# Patient Record
Sex: FEMALE | Race: WHITE | NOT HISPANIC OR LATINO | Employment: OTHER | URBAN - METROPOLITAN AREA
[De-identification: names, ages, dates, MRNs, and addresses within clinical notes are randomized per-mention and may not be internally consistent; named-entity substitution may affect disease eponyms.]

---

## 2017-02-09 ENCOUNTER — AMBULATORY - HEALTHEAST (OUTPATIENT)
Dept: CARDIOLOGY | Facility: CLINIC | Age: 82
End: 2017-02-09

## 2017-02-14 ENCOUNTER — AMBULATORY - HEALTHEAST (OUTPATIENT)
Dept: CARDIOLOGY | Facility: CLINIC | Age: 82
End: 2017-02-14

## 2017-02-14 ENCOUNTER — OFFICE VISIT - HEALTHEAST (OUTPATIENT)
Dept: CARDIOLOGY | Facility: CLINIC | Age: 82
End: 2017-02-14

## 2017-02-14 DIAGNOSIS — Z95.0 CARDIAC PACEMAKER IN SITU: ICD-10-CM

## 2017-02-14 DIAGNOSIS — I10 ESSENTIAL HYPERTENSION WITH GOAL BLOOD PRESSURE LESS THAN 140/90: ICD-10-CM

## 2017-02-14 DIAGNOSIS — I44.1: ICD-10-CM

## 2017-02-14 ASSESSMENT — MIFFLIN-ST. JEOR: SCORE: 947.52

## 2017-05-11 ENCOUNTER — AMBULATORY - HEALTHEAST (OUTPATIENT)
Dept: CARDIOLOGY | Facility: CLINIC | Age: 82
End: 2017-05-11

## 2017-05-11 DIAGNOSIS — Z95.0 CARDIAC PACEMAKER IN SITU: ICD-10-CM

## 2017-05-11 LAB — HCC DEVICE COMMENTS: NORMAL

## 2017-08-17 ENCOUNTER — AMBULATORY - HEALTHEAST (OUTPATIENT)
Dept: CARDIOLOGY | Facility: CLINIC | Age: 82
End: 2017-08-17

## 2017-08-17 DIAGNOSIS — Z95.0 CARDIAC PACEMAKER IN SITU: ICD-10-CM

## 2017-08-17 LAB — HCC DEVICE COMMENTS: NORMAL

## 2017-11-30 ENCOUNTER — AMBULATORY - HEALTHEAST (OUTPATIENT)
Dept: CARDIOLOGY | Facility: CLINIC | Age: 82
End: 2017-11-30

## 2017-11-30 DIAGNOSIS — Z95.0 CARDIAC PACEMAKER IN SITU: ICD-10-CM

## 2017-11-30 LAB — HCC DEVICE COMMENTS: NORMAL

## 2018-01-04 ENCOUNTER — RECORDS - HEALTHEAST (OUTPATIENT)
Dept: LAB | Facility: CLINIC | Age: 83
End: 2018-01-04

## 2018-01-04 LAB
ALBUMIN SERPL-MCNC: 3.3 G/DL (ref 3.5–5)
ALP SERPL-CCNC: 71 U/L (ref 45–120)
ALT SERPL W P-5'-P-CCNC: 20 U/L (ref 0–45)
ANION GAP SERPL CALCULATED.3IONS-SCNC: 11 MMOL/L (ref 5–18)
AST SERPL W P-5'-P-CCNC: 27 U/L (ref 0–40)
BILIRUB SERPL-MCNC: 0.3 MG/DL (ref 0–1)
BUN SERPL-MCNC: 25 MG/DL (ref 8–28)
CALCIUM SERPL-MCNC: 9.5 MG/DL (ref 8.5–10.5)
CHLORIDE BLD-SCNC: 104 MMOL/L (ref 98–107)
CHOLEST SERPL-MCNC: 183 MG/DL
CO2 SERPL-SCNC: 28 MMOL/L (ref 22–31)
CREAT SERPL-MCNC: 0.81 MG/DL (ref 0.6–1.1)
FASTING STATUS PATIENT QL REPORTED: NO
GFR SERPL CREATININE-BSD FRML MDRD: >60 ML/MIN/1.73M2
GLUCOSE BLD-MCNC: 167 MG/DL (ref 70–125)
HDLC SERPL-MCNC: 64 MG/DL
LDLC SERPL CALC-MCNC: 99 MG/DL
POTASSIUM BLD-SCNC: 3.7 MMOL/L (ref 3.5–5)
PROT SERPL-MCNC: 6.1 G/DL (ref 6–8)
SODIUM SERPL-SCNC: 143 MMOL/L (ref 136–145)
TRIGL SERPL-MCNC: 100 MG/DL

## 2018-03-06 ENCOUNTER — RECORDS - HEALTHEAST (OUTPATIENT)
Dept: ADMINISTRATIVE | Facility: OTHER | Age: 83
End: 2018-03-06

## 2018-03-06 ENCOUNTER — AMBULATORY - HEALTHEAST (OUTPATIENT)
Dept: CARDIOLOGY | Facility: CLINIC | Age: 83
End: 2018-03-06

## 2018-03-08 ENCOUNTER — AMBULATORY - HEALTHEAST (OUTPATIENT)
Dept: CARDIOLOGY | Facility: CLINIC | Age: 83
End: 2018-03-08

## 2018-03-08 ENCOUNTER — OFFICE VISIT - HEALTHEAST (OUTPATIENT)
Dept: CARDIOLOGY | Facility: CLINIC | Age: 83
End: 2018-03-08

## 2018-03-08 DIAGNOSIS — Z95.0 CARDIAC PACEMAKER IN SITU: ICD-10-CM

## 2018-03-08 DIAGNOSIS — I10 ESSENTIAL HYPERTENSION: ICD-10-CM

## 2018-03-08 DIAGNOSIS — Z95.0 S/P PLACEMENT OF CARDIAC PACEMAKER: ICD-10-CM

## 2018-03-08 LAB — HCC DEVICE COMMENTS: NORMAL

## 2018-03-08 ASSESSMENT — MIFFLIN-ST. JEOR
SCORE: 959.53
SCORE: 957.72

## 2018-06-07 ENCOUNTER — AMBULATORY - HEALTHEAST (OUTPATIENT)
Dept: CARDIOLOGY | Facility: CLINIC | Age: 83
End: 2018-06-07

## 2018-06-07 DIAGNOSIS — Z95.0 S/P PLACEMENT OF CARDIAC PACEMAKER: ICD-10-CM

## 2018-06-08 LAB
HCC DEVICE COMMENTS: NORMAL
HCC DEVICE IMPLANTING PROVIDER: NORMAL
HCC DEVICE MANUFACTURE: NORMAL
HCC DEVICE MODEL: NORMAL
HCC DEVICE SERIAL NUMBER: NORMAL
HCC DEVICE TYPE: NORMAL

## 2018-09-13 ENCOUNTER — AMBULATORY - HEALTHEAST (OUTPATIENT)
Dept: CARDIOLOGY | Facility: CLINIC | Age: 83
End: 2018-09-13

## 2018-09-13 DIAGNOSIS — Z95.0 CARDIAC PACEMAKER IN SITU: ICD-10-CM

## 2018-12-20 ENCOUNTER — AMBULATORY - HEALTHEAST (OUTPATIENT)
Dept: CARDIOLOGY | Facility: CLINIC | Age: 83
End: 2018-12-20

## 2018-12-20 DIAGNOSIS — Z95.0 CARDIAC PACEMAKER IN SITU: ICD-10-CM

## 2019-01-15 ENCOUNTER — RECORDS - HEALTHEAST (OUTPATIENT)
Dept: LAB | Facility: CLINIC | Age: 84
End: 2019-01-15

## 2019-01-15 LAB
ALBUMIN SERPL-MCNC: 3.6 G/DL (ref 3.5–5)
ALP SERPL-CCNC: 48 U/L (ref 45–120)
ALT SERPL W P-5'-P-CCNC: 18 U/L (ref 0–45)
ANION GAP SERPL CALCULATED.3IONS-SCNC: 10 MMOL/L (ref 5–18)
AST SERPL W P-5'-P-CCNC: 26 U/L (ref 0–40)
BILIRUB SERPL-MCNC: 0.8 MG/DL (ref 0–1)
BUN SERPL-MCNC: 20 MG/DL (ref 8–28)
CALCIUM SERPL-MCNC: 9.6 MG/DL (ref 8.5–10.5)
CHLORIDE BLD-SCNC: 100 MMOL/L (ref 98–107)
CHOLEST SERPL-MCNC: 194 MG/DL
CO2 SERPL-SCNC: 29 MMOL/L (ref 22–31)
CREAT SERPL-MCNC: 0.83 MG/DL (ref 0.6–1.1)
FASTING STATUS PATIENT QL REPORTED: YES
GFR SERPL CREATININE-BSD FRML MDRD: >60 ML/MIN/1.73M2
GLUCOSE BLD-MCNC: 90 MG/DL (ref 70–125)
HDLC SERPL-MCNC: 68 MG/DL
LDLC SERPL CALC-MCNC: 112 MG/DL
POTASSIUM BLD-SCNC: 3.9 MMOL/L (ref 3.5–5)
PROT SERPL-MCNC: 6.4 G/DL (ref 6–8)
SODIUM SERPL-SCNC: 139 MMOL/L (ref 136–145)
TRIGL SERPL-MCNC: 69 MG/DL

## 2019-02-12 ENCOUNTER — RECORDS - HEALTHEAST (OUTPATIENT)
Dept: ADMINISTRATIVE | Facility: OTHER | Age: 84
End: 2019-02-12

## 2019-02-12 ENCOUNTER — AMBULATORY - HEALTHEAST (OUTPATIENT)
Dept: CARDIOLOGY | Facility: CLINIC | Age: 84
End: 2019-02-12

## 2019-05-09 ENCOUNTER — AMBULATORY - HEALTHEAST (OUTPATIENT)
Dept: CARDIOLOGY | Facility: CLINIC | Age: 84
End: 2019-05-09

## 2019-05-09 DIAGNOSIS — Z95.0 CARDIAC PACEMAKER IN SITU: ICD-10-CM

## 2019-07-22 ENCOUNTER — AMBULATORY - HEALTHEAST (OUTPATIENT)
Dept: CARDIOLOGY | Facility: CLINIC | Age: 84
End: 2019-07-22

## 2019-07-22 ENCOUNTER — OFFICE VISIT - HEALTHEAST (OUTPATIENT)
Dept: CARDIOLOGY | Facility: CLINIC | Age: 84
End: 2019-07-22

## 2019-07-22 DIAGNOSIS — Z95.0 CARDIAC PACEMAKER IN SITU: ICD-10-CM

## 2019-07-22 DIAGNOSIS — Z95.0 S/P PLACEMENT OF CARDIAC PACEMAKER: ICD-10-CM

## 2019-07-22 DIAGNOSIS — I10 ESSENTIAL HYPERTENSION: ICD-10-CM

## 2019-07-22 RX ORDER — ALENDRONATE SODIUM 70 MG/1
70 TABLET ORAL WEEKLY
Refills: 3 | Status: SHIPPED | COMMUNITY
Start: 2019-04-27 | End: 2024-06-03

## 2019-07-22 ASSESSMENT — MIFFLIN-ST. JEOR: SCORE: 954.32

## 2019-10-17 ENCOUNTER — AMBULATORY - HEALTHEAST (OUTPATIENT)
Dept: CARDIOLOGY | Facility: CLINIC | Age: 84
End: 2019-10-17

## 2019-10-17 DIAGNOSIS — Z95.0 CARDIAC PACEMAKER IN SITU: ICD-10-CM

## 2020-01-16 ENCOUNTER — AMBULATORY - HEALTHEAST (OUTPATIENT)
Dept: CARDIOLOGY | Facility: CLINIC | Age: 85
End: 2020-01-16

## 2020-01-16 DIAGNOSIS — Z95.0 CARDIAC PACEMAKER IN SITU: ICD-10-CM

## 2020-01-16 DIAGNOSIS — I44.1 SECOND DEGREE AV BLOCK: ICD-10-CM

## 2020-01-27 ENCOUNTER — RECORDS - HEALTHEAST (OUTPATIENT)
Dept: LAB | Facility: CLINIC | Age: 85
End: 2020-01-27

## 2020-01-27 LAB
ALBUMIN SERPL-MCNC: 3.4 G/DL (ref 3.5–5)
ALP SERPL-CCNC: 52 U/L (ref 45–120)
ALT SERPL W P-5'-P-CCNC: 19 U/L (ref 0–45)
ANION GAP SERPL CALCULATED.3IONS-SCNC: 11 MMOL/L (ref 5–18)
AST SERPL W P-5'-P-CCNC: 28 U/L (ref 0–40)
BILIRUB SERPL-MCNC: 0.2 MG/DL (ref 0–1)
BUN SERPL-MCNC: 21 MG/DL (ref 8–28)
CALCIUM SERPL-MCNC: 9.3 MG/DL (ref 8.5–10.5)
CHLORIDE BLD-SCNC: 103 MMOL/L (ref 98–107)
CHOLEST SERPL-MCNC: 175 MG/DL
CO2 SERPL-SCNC: 24 MMOL/L (ref 22–31)
CREAT SERPL-MCNC: 0.83 MG/DL (ref 0.6–1.1)
FASTING STATUS PATIENT QL REPORTED: NORMAL
GFR SERPL CREATININE-BSD FRML MDRD: >60 ML/MIN/1.73M2
GLUCOSE BLD-MCNC: 106 MG/DL (ref 70–125)
HDLC SERPL-MCNC: 64 MG/DL
LDLC SERPL CALC-MCNC: 82 MG/DL
POTASSIUM BLD-SCNC: 3.4 MMOL/L (ref 3.5–5)
PROT SERPL-MCNC: 6 G/DL (ref 6–8)
SODIUM SERPL-SCNC: 138 MMOL/L (ref 136–145)
TRIGL SERPL-MCNC: 147 MG/DL

## 2020-01-28 LAB — HCV AB SERPL QL IA: NEGATIVE

## 2020-04-16 ENCOUNTER — AMBULATORY - HEALTHEAST (OUTPATIENT)
Dept: CARDIOLOGY | Facility: CLINIC | Age: 85
End: 2020-04-16

## 2020-04-16 DIAGNOSIS — Z95.0 CARDIAC PACEMAKER IN SITU: ICD-10-CM

## 2020-04-16 DIAGNOSIS — I44.1 SECOND DEGREE AV BLOCK: ICD-10-CM

## 2020-08-13 ENCOUNTER — AMBULATORY - HEALTHEAST (OUTPATIENT)
Dept: CARDIOLOGY | Facility: CLINIC | Age: 85
End: 2020-08-13

## 2020-08-13 DIAGNOSIS — Z95.0 CARDIAC PACEMAKER IN SITU: ICD-10-CM

## 2020-08-13 DIAGNOSIS — I44.1 SECOND DEGREE AV BLOCK: ICD-10-CM

## 2020-09-08 ENCOUNTER — RECORDS - HEALTHEAST (OUTPATIENT)
Dept: ADMINISTRATIVE | Facility: OTHER | Age: 85
End: 2020-09-08

## 2020-09-08 ENCOUNTER — COMMUNICATION - HEALTHEAST (OUTPATIENT)
Dept: CARDIOLOGY | Facility: CLINIC | Age: 85
End: 2020-09-08

## 2020-09-09 ENCOUNTER — OFFICE VISIT - HEALTHEAST (OUTPATIENT)
Dept: CARDIOLOGY | Facility: CLINIC | Age: 85
End: 2020-09-09

## 2020-09-09 DIAGNOSIS — I10 ESSENTIAL HYPERTENSION: ICD-10-CM

## 2020-09-09 DIAGNOSIS — I44.1 SECOND DEGREE AV BLOCK: ICD-10-CM

## 2020-09-09 DIAGNOSIS — Z95.0 S/P PLACEMENT OF CARDIAC PACEMAKER: ICD-10-CM

## 2020-09-09 ASSESSMENT — MIFFLIN-ST. JEOR: SCORE: 978.36

## 2020-11-12 ENCOUNTER — AMBULATORY - HEALTHEAST (OUTPATIENT)
Dept: CARDIOLOGY | Facility: CLINIC | Age: 85
End: 2020-11-12

## 2020-11-12 DIAGNOSIS — Z95.0 S/P PLACEMENT OF CARDIAC PACEMAKER: ICD-10-CM

## 2020-11-12 DIAGNOSIS — I44.1 SECOND DEGREE AV BLOCK: ICD-10-CM

## 2021-02-08 ENCOUNTER — RECORDS - HEALTHEAST (OUTPATIENT)
Dept: LAB | Facility: CLINIC | Age: 86
End: 2021-02-08

## 2021-02-08 LAB
ALBUMIN SERPL-MCNC: 3.7 G/DL (ref 3.5–5)
ALP SERPL-CCNC: 50 U/L (ref 45–120)
ALT SERPL W P-5'-P-CCNC: 17 U/L (ref 0–45)
ANION GAP SERPL CALCULATED.3IONS-SCNC: 9 MMOL/L (ref 5–18)
AST SERPL W P-5'-P-CCNC: 27 U/L (ref 0–40)
BILIRUB SERPL-MCNC: 0.3 MG/DL (ref 0–1)
BUN SERPL-MCNC: 23 MG/DL (ref 8–28)
CALCIUM SERPL-MCNC: 9 MG/DL (ref 8.5–10.5)
CHLORIDE BLD-SCNC: 100 MMOL/L (ref 98–107)
CHOLEST SERPL-MCNC: 180 MG/DL
CO2 SERPL-SCNC: 29 MMOL/L (ref 22–31)
CREAT SERPL-MCNC: 0.7 MG/DL (ref 0.6–1.1)
FASTING STATUS PATIENT QL REPORTED: NO
GFR SERPL CREATININE-BSD FRML MDRD: >60 ML/MIN/1.73M2
GLUCOSE BLD-MCNC: 81 MG/DL (ref 70–125)
HDLC SERPL-MCNC: 67 MG/DL
LDLC SERPL CALC-MCNC: 85 MG/DL
POTASSIUM BLD-SCNC: 3.7 MMOL/L (ref 3.5–5)
PROT SERPL-MCNC: 6.2 G/DL (ref 6–8)
SODIUM SERPL-SCNC: 138 MMOL/L (ref 136–145)
TRIGL SERPL-MCNC: 138 MG/DL

## 2021-02-11 ENCOUNTER — AMBULATORY - HEALTHEAST (OUTPATIENT)
Dept: CARDIOLOGY | Facility: CLINIC | Age: 86
End: 2021-02-11

## 2021-02-11 DIAGNOSIS — Z95.0 S/P PLACEMENT OF CARDIAC PACEMAKER: ICD-10-CM

## 2021-02-11 DIAGNOSIS — I44.1 SECOND DEGREE AV BLOCK: ICD-10-CM

## 2021-05-13 ENCOUNTER — AMBULATORY - HEALTHEAST (OUTPATIENT)
Dept: CARDIOLOGY | Facility: CLINIC | Age: 86
End: 2021-05-13

## 2021-05-13 DIAGNOSIS — I44.1 SECOND DEGREE AV BLOCK: ICD-10-CM

## 2021-05-13 DIAGNOSIS — Z95.0 S/P PLACEMENT OF CARDIAC PACEMAKER: ICD-10-CM

## 2021-05-25 ENCOUNTER — RECORDS - HEALTHEAST (OUTPATIENT)
Dept: ADMINISTRATIVE | Facility: CLINIC | Age: 86
End: 2021-05-25

## 2021-05-27 ENCOUNTER — RECORDS - HEALTHEAST (OUTPATIENT)
Dept: ADMINISTRATIVE | Facility: CLINIC | Age: 86
End: 2021-05-27

## 2021-05-28 ENCOUNTER — RECORDS - HEALTHEAST (OUTPATIENT)
Dept: ADMINISTRATIVE | Facility: CLINIC | Age: 86
End: 2021-05-28

## 2021-05-29 ENCOUNTER — RECORDS - HEALTHEAST (OUTPATIENT)
Dept: ADMINISTRATIVE | Facility: CLINIC | Age: 86
End: 2021-05-29

## 2021-05-30 ENCOUNTER — RECORDS - HEALTHEAST (OUTPATIENT)
Dept: ADMINISTRATIVE | Facility: CLINIC | Age: 86
End: 2021-05-30

## 2021-05-30 VITALS — BODY MASS INDEX: 21.62 KG/M2 | WEIGHT: 122 LBS | HEIGHT: 63 IN

## 2021-05-30 NOTE — PROGRESS NOTES
In clinic device check with Dr. Hollingsworth.  Please see link for full device report.  Patient was informed of results and next follow up during today's visit.

## 2021-06-01 VITALS — WEIGHT: 119.4 LBS | HEIGHT: 65 IN | BODY MASS INDEX: 19.89 KG/M2

## 2021-06-01 VITALS — BODY MASS INDEX: 19.83 KG/M2 | WEIGHT: 119 LBS | HEIGHT: 65 IN

## 2021-06-02 ENCOUNTER — RECORDS - HEALTHEAST (OUTPATIENT)
Dept: ADMINISTRATIVE | Facility: CLINIC | Age: 86
End: 2021-06-02

## 2021-06-03 ENCOUNTER — RECORDS - HEALTHEAST (OUTPATIENT)
Dept: ADMINISTRATIVE | Facility: CLINIC | Age: 86
End: 2021-06-03

## 2021-06-03 VITALS — BODY MASS INDEX: 20.49 KG/M2 | HEIGHT: 64 IN | WEIGHT: 120 LBS

## 2021-06-04 VITALS
BODY MASS INDEX: 21.39 KG/M2 | HEART RATE: 72 BPM | DIASTOLIC BLOOD PRESSURE: 60 MMHG | SYSTOLIC BLOOD PRESSURE: 128 MMHG | HEIGHT: 64 IN | WEIGHT: 125.3 LBS | RESPIRATION RATE: 8 BRPM

## 2021-06-11 NOTE — TELEPHONE ENCOUNTER
9/9/2020 11:30 AM (20 min)   [place]     Arrive by: 11:15 AM    OFFICE VISIT    CLEO GRAY [[facility]]    Mabel Hollingsworth MD      Wellness Screening Tool  Symptom Screening:  Do you have one of the following NEW symptoms:    Fever (subjective or >100.0)?  No    A new cough?  No    Shortness of breath?  No     Chills? No     New loss of taste or smell? No     Generalized body aches? No     New persistent headache? No     New sore throat? No     Nausea, vomiting, or diarrhea?  No    Within the past 2 weeks, have you been exposed to someone with a known positive illness below:    COVID-19 (known or suspected)?  No    Chicken pox?  No    Mealses?  No    Pertussis?  No    Patient notified of visitor policy- They may have one person accompany them to their appointment, but they will need to wear a mask and will be screened upon arrival for symptoms: Yes  Pt informed to wear a mask: Yes  Pt notified if they develop any symptoms listed above, prior to their appointment, they are to call the clinic directly at [phone] for further instructions.  Yes  Patient's appointment status: Patient will be seen in clinic as scheduled on 9/9/20.  antony

## 2021-06-12 NOTE — PROGRESS NOTES
TTM device check.  Please see link for full device report.  Patient was informed of results and next follow up via mail. Patient was informed of results and next follow up via phone call.

## 2021-06-15 ENCOUNTER — RECORDS - HEALTHEAST (OUTPATIENT)
Dept: ADMINISTRATIVE | Facility: OTHER | Age: 86
End: 2021-06-15

## 2021-06-15 ENCOUNTER — RECORDS - HEALTHEAST (OUTPATIENT)
Dept: CARDIOLOGY | Facility: CLINIC | Age: 86
End: 2021-06-15

## 2021-06-16 ENCOUNTER — AMBULATORY - HEALTHEAST (OUTPATIENT)
Dept: CARDIOLOGY | Facility: CLINIC | Age: 86
End: 2021-06-16

## 2021-06-16 ENCOUNTER — OFFICE VISIT - HEALTHEAST (OUTPATIENT)
Dept: CARDIOLOGY | Facility: CLINIC | Age: 86
End: 2021-06-16

## 2021-06-16 DIAGNOSIS — I44.1 SECOND DEGREE AV BLOCK: ICD-10-CM

## 2021-06-16 DIAGNOSIS — Z95.0 S/P PLACEMENT OF CARDIAC PACEMAKER: ICD-10-CM

## 2021-06-16 DIAGNOSIS — Z95.0 CARDIAC PACEMAKER IN SITU: ICD-10-CM

## 2021-06-16 DIAGNOSIS — I10 ESSENTIAL HYPERTENSION: ICD-10-CM

## 2021-06-16 ASSESSMENT — MIFFLIN-ST. JEOR: SCORE: 940.71

## 2021-06-16 NOTE — PROGRESS NOTES
In clinic device check with Dr. Holilngsworth.  Please see link for full device report.  Patient was informed of results and next follow up during today's visit.

## 2021-06-25 NOTE — PROGRESS NOTES
Progress Notes by Mabel Hollingsworth MD at 2/14/2017 12:50 PM     Author: Mabel Hollingsworth MD Service: -- Author Type: Physician    Filed: 2/14/2017  1:05 PM Encounter Date: 2/14/2017 Status: Signed    : Mabel Hollingsworth MD (Physician)           Click to link to [facility]     [facility] HEART CARE NOTE       Assessment/Plan:   An 85-year-old woman presents to clinic today for routine cardiology followup.     1. Second degree AV block, status post dual chamber pacemaker placement. The patient's pacemaker interrogation today indicated normal pacemaker function. She has no cardiac arrhythmia.  Clinically, she has been doing quite well, no symptoms.    2. Hypertension. Her blood pressure has been controlled well. Continue amlodipine, lisinopril, and hydrochlorothiazide.      Thank you for the opportunity to be involved in the care of Siobhan Dawson. If you have any questions, please feel free to contact me.  I will see the patient again in 12 months.     History of Present Illness:   It is my pleasure to see Ms. Siobhan Dawson today at the [facility] in [facility] for routine cardiology followup. Siobhan is an 85-year-old pleasant woman with a medical history of essential hypertension, hepatitis C carrier, second-degree AV block, status post dual-chamber pacemaker placement.     The patient states that she has been doing quite well over the last year. She denies any chest pain, shortness of breath, palpitations, dizziness, PND, or leg swelling. She is very active without any symptoms. Her blood pressure is controlled well.     Her pacemaker was interrogated today, which was reported normal function of pacemaker without any cardiac arrhythmia.    Past Medical History:     Patient Active Problem List   Diagnosis   ? Hypertension   ? Second Degree A-V Block   ? Cardiac Devices Pacemaker Present   ? 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 No pertinent  "surgical history.    Family History:   History reviewed. No pertinent family history.    Social History:    reports that she has never smoked. She has never used smokeless tobacco. She reports that she does not use illicit drugs.    Review of Systems:   General: WNL  Eyes: WNL  Ears/Nose/Throat: WNL  Lungs: WNL  Heart: WNL  Stomach: WNL  Bladder: WNL  Muscle/Joints: WNL  Skin: WNL  Nervous System: WNL  Mental Health: WNL     Blood: WNL    Meds:     Current Outpatient Prescriptions:   ?  amLODIPine (NORVASC) 2.5 MG tablet, Take 1 tablet daily, Disp: , Rfl:   ?  aspirin 81 mg chewable tablet, Chew 81 mg every other day. , Disp: , Rfl:   ?  calcium carbonate-vit D3-min 600 mg calcium- 200 unit Tab, Take 1 tablet by mouth every other day. , Disp: , Rfl:   ?  lisinopril-hydrochlorothiazide (PRINZIDE,ZESTORETIC) 20-25 mg per tablet, Take 0.5 tablets by mouth daily. , Disp: , Rfl:   ?  multivitamin therapeutic (THERAGRAN) tablet, Take 1 tablet by mouth daily., Disp: , Rfl:   ?  potassium chloride 20 mEq TbER, Take 1 tablet by mouth daily., Disp: , Rfl:     Allergies:   Review of patient's allergies indicates no known allergies.      Objective:      Physical Exam  122 lb (55.3 kg)  5' 3\" (1.6 m)  Body mass index is 21.61 kg/(m^2).  Visit Vitals   ? /70 (Patient Site: Left Arm, Patient Position: Sitting, Cuff Size: Adult Regular)   ? Pulse 63   ? Ht 5' 3\" (1.6 m)   ? Wt 122 lb (55.3 kg)   ? BMI 21.61 kg/m2       General Appearance:   Awake, Alert, No acute distress.   HEENT:  Pupil equal and reactive to light. No scleral icterus; the mucous membranes were moist.   Neck: No cervical bruits. No JVD. No thyromegaly.     Chest: The spine was straight. The chest was symmetric.   Lungs:   Respirations unlabored; Lungs are clear to auscultation. No crackles. No wheezing.   Cardiovascular:   Regular rhythm and rate, normal first and second heart sounds with no murmurs. No rubs or gallops.    Abdomen:  Soft. No tenderness. " Non-distended. Bowels sounds are present   Extremities: Equal tibial pulses. No leg edema.   Skin: No rashes or ulcers. Warm, Dry.   Musculoskeletal: No tenderness. No deformity.   Neurologic: Mood and affect are appropriate. No focal deficits.         Lab Review   Lab Results   Component Value Date     12/21/2016    K 4.1 12/21/2016     (H) 12/21/2016    CO2 27 12/21/2016    BUN 16 12/21/2016    CREATININE 0.75 12/21/2016    CALCIUM 9.0 12/21/2016     Lab Results   Component Value Date    WBC 6.4 09/12/2011    HGB 13.9 09/12/2011    HCT 40.2 09/12/2011    MCV 89 09/12/2011     09/12/2011     Lab Results   Component Value Date    CHOL 192 12/21/2016    CHOL 192 12/14/2015    CHOL 186 12/11/2014     Lab Results   Component Value Date    HDL 67 12/21/2016    HDL 63 12/14/2015    HDL 71 12/11/2014     Lab Results   Component Value Date    LDLCALC 108 12/21/2016    LDLCALC 111 12/14/2015    LDLCALC 102 12/11/2014     Lab Results   Component Value Date    TRIG 85 12/21/2016    TRIG 90 12/14/2015    TRIG 67 12/11/2014

## 2021-06-26 NOTE — PROGRESS NOTES
Progress Notes by Mabel Hollingsworth MD at 3/8/2018  2:30 PM     Author: Mabel Hollingsworth MD Service: -- Author Type: Physician    Filed: 3/8/2018  2:51 PM Encounter Date: 3/8/2018 Status: Signed    : Mabel Hollingsworth MD (Physician)           Click to link to [facility]     [facility] HEART CARE NOTE       Assessment/Plan:   An 86-year-old woman presents to clinic today for routine cardiology followup.     1. Second degree AV block, status post dual chamber pacemaker placement. The patient's pacemaker interrogation today indicated normal pacemaker function. She has no cardiac arrhythmia.  Clinically, she has been doing quite well, no symptoms.    2. Essential hypertension. Her blood pressure has been controlled well. Continue amlodipine, lisinopril, and hydrochlorothiazide.    Thank you for the opportunity to be involved in the care of Siobhan Dawson. If you have any questions, please feel free to contact me.  I will see the patient again in 12 months.     History of Present Illness:   It is my pleasure to see Ms. Siobhan Dawson today at the [facility] in [facility] for routine cardiology followup. Siobhan is an 86-year-old pleasant woman with a medical history of essential hypertension, hepatitis C carrier, second-degree AV block, status post dual-chamber pacemaker placement.     The patient states that she has been doing quite well over the last year. She got a cold at this time, but improving. She denies chest pain, shortness of breath, palpitations, dizziness, PND, or leg swelling. She is very active without any symptoms. Her blood pressure is controlled well.     Her pacemaker was interrogated today, which was reported normal function of pacemaker without any cardiac arrhythmia.    Past Medical History:     Patient Active Problem List   Diagnosis   ? Hypertension   ? Second Degree A-V Block   ? Cadiac pacemaker in situ, dual chamber   ? 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 No pertinent surgical history.    Family History:   History reviewed. No pertinent family history.     Social History:    reports that she has never smoked. She has never used smokeless tobacco. She reports that she does not use illicit drugs.    Review of Systems:   General: WNL  Eyes: WNL  Ears/Nose/Throat: WNL  Lungs: WNL  Heart: WNL  Stomach: WNL  Bladder: WNL  Muscle/Joints: WNL  Skin: WNL  Nervous System: WNL  Mental Health: WNL     Blood: WNL    Meds:     Current Outpatient Prescriptions:   ?  amLODIPine (NORVASC) 2.5 MG tablet, Take 1 tablet daily, Disp: , Rfl:   ?  aspirin 81 mg chewable tablet, Chew 81 mg every other day. , Disp: , Rfl:   ?  calcium carbonate-vit D3-min 600 mg calcium- 200 unit Tab, Take 1 tablet by mouth every other day. , Disp: , Rfl:   ?  lisinopril-hydrochlorothiazide (PRINZIDE,ZESTORETIC) 20-25 mg per tablet, Take 0.5 tablets by mouth daily. , Disp: , Rfl:   ?  multivitamin therapeutic (THERAGRAN) tablet, Take 1 tablet by mouth daily., Disp: , Rfl:   ?  potassium chloride 20 mEq TbER, Take 1 tablet by mouth daily., Disp: , Rfl:     Allergies:   Review of patient's allergies indicates no known allergies.      Objective:      Physical Exam  119 lb (54 kg)  5' 4.5\" (1.638 m)  Body mass index is 20.11 kg/(m^2).  /74 (Patient Site: Left Arm, Patient Position: Sitting, Cuff Size: Adult Regular)  Pulse 60  Resp 12  Ht 5' 4.5\" (1.638 m)  Wt 119 lb (54 kg)  BMI 20.11 kg/m2    General Appearance:   Awake, Alert, No acute distress.   HEENT:  Pupil equal and reactive to light. No scleral icterus; the mucous membranes were moist.   Neck: No cervical bruits. No JVD. No thyromegaly.     Chest: The spine was straight. The chest was symmetric.   Lungs:   Respirations unlabored; Lungs are clear to auscultation. No crackles. No wheezing.   Cardiovascular:   Regular rhythm and rate, normal first and second heart sounds with no murmurs. No rubs or gallops.  "   Abdomen:  Soft. No tenderness. Non-distended. Bowels sounds are present   Extremities: Equal tibial pulses. No leg edema.   Skin: No rashes or ulcers. Warm, Dry.   Musculoskeletal: No tenderness. No deformity.   Neurologic: Mood and affect are appropriate. No focal deficits.         Pacemaker interrogation today: Personally reviewed  Normal device function, no cardiac arrhytmia    Lab Review   Lab Results   Component Value Date     01/04/2018    K 3.7 01/04/2018     01/04/2018    CO2 28 01/04/2018    BUN 25 01/04/2018    CREATININE 0.81 01/04/2018    CALCIUM 9.5 01/04/2018     Lab Results   Component Value Date    WBC 6.4 09/12/2011    HGB 13.9 09/12/2011    HCT 40.2 09/12/2011    MCV 89 09/12/2011     09/12/2011     Lab Results   Component Value Date    CHOL 183 01/04/2018    CHOL 192 12/21/2016    CHOL 192 12/14/2015     Lab Results   Component Value Date    HDL 64 01/04/2018    HDL 67 12/21/2016    HDL 63 12/14/2015     Lab Results   Component Value Date    LDLCALC 99 01/04/2018    LDLCALC 108 12/21/2016    LDLCALC 111 12/14/2015     Lab Results   Component Value Date    TRIG 100 01/04/2018    TRIG 85 12/21/2016    TRIG 90 12/14/2015

## 2021-06-27 NOTE — PROGRESS NOTES
Progress Notes by Mabel Hollingsworth MD at 7/22/2019 12:50 PM     Author: Mabel Hollingsworth MD Service: -- Author Type: Physician    Filed: 7/22/2019 12:54 PM Encounter Date: 7/22/2019 Status: Signed    : Mabel Hollingsworth MD (Physician)           Click to link to [facility] HEART [facility] NOTE       Assessment/Plan:   An 87-year-old woman presents to clinic today for routine cardiology followup.     1. Second degree AV block, status post dual chamber pacemaker placement. The patient's pacemaker interrogation today indicated normal pacemaker function. She has no cardiac arrhythmia.  Clinically, she has been doing quite well, no symptoms.    2. Essential hypertension. Her blood pressure has been controlled well. Continue amlodipine, lisinopril, and hydrochlorothiazide.    Thank you for the opportunity to be involved in the care of Siobhan Dawson. If you have any questions, please feel free to contact me.  I will see the patient again in 12 months and as needed.     History of Present Illness:   It is my pleasure to see Ms. Siobhan Dawson today at the [facility] for routine cardiology followup. Siobhan is an 87-year-old pleasant woman with a medical history of essential hypertension, hepatitis C carrier, second-degree AV block, status post dual-chamber pacemaker placement.     The patient states that she has been doing quite well over the last year. She denies chest pain, shortness of breath, palpitations, dizziness, PND, or leg swelling. She is very active without any symptoms. Her blood pressure is controlled well.     Her pacemaker was interrogated today, which was reported normal function of pacemaker without any cardiac arrhythmia.    Past Medical History:     Patient Active Problem List   Diagnosis   ? Hypertension   ? S/P placement of cardiac pacemaker   ? Cadiac pacemaker in situ, dual chamber   ? 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 No  "pertinent surgical history.    Family History:   History reviewed. No pertinent family history.     Social History:    reports that she has never smoked. She has never used smokeless tobacco. She reports that she does not use drugs.    Review of Systems:   General: WNL  Eyes: WNL  Ears/Nose/Throat: WNL  Lungs: WNL  Heart: WNL  Stomach: WNL  Bladder: WNL  Muscle/Joints: WNL  Skin: WNL  Nervous System: WNL  Mental Health: WNL     Blood: WNL    Meds:     Current Outpatient Medications:   ?  alendronate (FOSAMAX) 70 MG tablet, Take 70 mg by mouth once a week., Disp: , Rfl: 3  ?  amLODIPine (NORVASC) 2.5 MG tablet, Take 1 tablet daily, Disp: , Rfl:   ?  aspirin 81 mg chewable tablet, Chew 81 mg every other day. , Disp: , Rfl:   ?  lisinopril-hydrochlorothiazide (PRINZIDE,ZESTORETIC) 20-25 mg per tablet, Take 0.5 tablets by mouth daily. , Disp: , Rfl:   ?  multivitamin therapeutic (THERAGRAN) tablet, Take 1 tablet by mouth daily., Disp: , Rfl:   ?  potassium chloride 20 mEq TbER, Take 1 tablet by mouth daily., Disp: , Rfl:   ?  calcium carbonate-vit D3-min 600 mg calcium- 200 unit Tab, Take 1 tablet by mouth every other day. , Disp: , Rfl:     Allergies:   Patient has no known allergies.      Objective:      Physical Exam  120 lb (54.4 kg)  5' 4\" (1.626 m)  Body mass index is 20.6 kg/m .  /64 (Patient Site: Right Arm, Patient Position: Sitting, Cuff Size: Adult Regular)   Pulse 72   Resp 16   Ht 5' 4\" (1.626 m)   Wt 120 lb (54.4 kg)   BMI 20.60 kg/m      General Appearance:   Awake, Alert, No acute distress.   HEENT:  Pupil equal and reactive to light. No scleral icterus; the mucous membranes were moist.   Neck: No cervical bruits. No JVD. No thyromegaly.     Chest: The spine was straight. The chest was symmetric.   Lungs:   Respirations unlabored; Lungs are clear to auscultation. No crackles. No wheezing.   Cardiovascular:   Regular rhythm and rate, normal first and second heart sounds with no murmurs. No rubs " or gallops.    Abdomen:  Soft. No tenderness. Non-distended. Bowels sounds are present   Extremities: Equal tibial pulses. No leg edema.   Skin: No rashes or ulcers. Warm, Dry.   Musculoskeletal: No tenderness. No deformity.   Neurologic: Mood and affect are appropriate. No focal deficits.         Pacemaker interrogation today: Personally reviewed  Normal device function, no cardiac arrhytmia    Lab Review   Lab Results   Component Value Date     01/15/2019    K 3.9 01/15/2019     01/15/2019    CO2 29 01/15/2019    BUN 20 01/15/2019    CREATININE 0.83 01/15/2019    CALCIUM 9.6 01/15/2019     Lab Results   Component Value Date    WBC 6.4 09/12/2011    HGB 13.9 09/12/2011    HCT 40.2 09/12/2011    MCV 89 09/12/2011     09/12/2011     Lab Results   Component Value Date    CHOL 194 01/15/2019    CHOL 183 01/04/2018    CHOL 192 12/21/2016     Lab Results   Component Value Date    HDL 68 01/15/2019    HDL 64 01/04/2018    HDL 67 12/21/2016     Lab Results   Component Value Date    LDLCALC 112 01/15/2019    LDLCALC 99 01/04/2018    LDLCALC 108 12/21/2016     Lab Results   Component Value Date    TRIG 69 01/15/2019    TRIG 100 01/04/2018    TRIG 85 12/21/2016

## 2021-06-29 NOTE — PROGRESS NOTES
Progress Notes by Mabel Hollingsworth MD at 9/9/2020 11:30 AM     Author: Mabel Hollingsworth MD Service: -- Author Type: Physician    Filed: 9/9/2020 11:29 AM Encounter Date: 9/9/2020 Status: Signed    : Mabel Hollingsworth MD (Physician)           Click to link to [facility] HEART [facility] NOTE       Assessment/Plan:   An 88-year-old woman presents to clinic today for routine cardiology followup.     1. Second degree AV block, status post dual chamber pacemaker placement. The patient's pacemaker interrogation today indicated normal pacemaker function. She has no cardiac arrhythmia.  Clinically, she has been doing quite well, no symptoms.    2. Essential hypertension. Her blood pressure has been controlled well. Continue amlodipine, lisinopril-hydrochlorothiazide.    Thank you for the opportunity to be involved in the care of Siobhan Dawson. If you have any questions, please feel free to contact me.  I will see the patient again in 12 months and as needed.     History of Present Illness:   It is my pleasure to see Ms. Siobhan Dawson today at the [facility] for routine cardiology followup. Siobhan is an 88-year-old pleasant woman with a medical history of essential hypertension, hepatitis C carrier, second-degree AV block, status post dual-chamber pacemaker placement.     The patient states that she has been doing quite well over the last year. She denies chest pain, shortness of breath, palpitations, dizziness, PND, or leg swelling. She is very active without any symptoms. Her blood pressure is controlled well.     Her pacemaker check was reported normal function of pacemaker without any cardiac arrhythmia.    Past Medical History:     Patient Active Problem List   Diagnosis   ? Hypertension   ? S/P placement of cardiac pacemaker   ? Cadiac pacemaker in situ, dual chamber   ? 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 Second degree AV block       Past Surgical History:   History reviewed. No pertinent  "surgical history.    Family History:   History reviewed. No pertinent family history.     Social History:    reports that she has never smoked. She has never used smokeless tobacco. She reports that she does not use drugs.    Review of Systems:   General: WNL  Eyes: WNL  Ears/Nose/Throat: WNL  Lungs: WNL  Heart: WNL  Stomach: WNL  Bladder: WNL  Muscle/Joints: WNL  Skin: WNL  Nervous System: WNL  Mental Health: WNL     Blood: WNL    Meds:     Current Outpatient Medications:   ?  alendronate (FOSAMAX) 70 MG tablet, Take 70 mg by mouth once a week., Disp: , Rfl: 3  ?  amLODIPine (NORVASC) 2.5 MG tablet, Take 1 tablet daily, Disp: , Rfl:   ?  aspirin 81 mg chewable tablet, Chew 81 mg every other day. , Disp: , Rfl:   ?  calcium carbonate-vit D3-min 600 mg calcium- 200 unit Tab, Take 1 tablet by mouth every other day. , Disp: , Rfl:   ?  lisinopril-hydrochlorothiazide (PRINZIDE,ZESTORETIC) 20-25 mg per tablet, Take 0.5 tablets by mouth daily. , Disp: , Rfl:   ?  multivitamin therapeutic (THERAGRAN) tablet, Take 1 tablet by mouth daily., Disp: , Rfl:   ?  potassium chloride 20 mEq TbER, Take 1 tablet by mouth daily., Disp: , Rfl:     Allergies:   Patient has no known allergies.      Objective:      Physical Exam  125 lb 4.8 oz (56.8 kg)  5' 4\" (1.626 m)  Body mass index is 21.51 kg/m .  /60 (Patient Site: Left Arm, Patient Position: Sitting, Cuff Size: Adult Regular)   Pulse 72   Resp 8   Ht 5' 4\" (1.626 m)   Wt 125 lb 4.8 oz (56.8 kg)   BMI 21.51 kg/m      General Appearance:   Awake, Alert, No acute distress.   HEENT:  Pupil equal and reactive to light. No scleral icterus; the mucous membranes were moist.   Neck: No cervical bruits. No JVD. No thyromegaly.     Chest: The spine was straight. The chest was symmetric.   Lungs:   Respirations unlabored; Lungs are clear to auscultation. No crackles. No wheezing.   Cardiovascular:   Regular rhythm and rate, normal first and second heart sounds with no murmurs. No " rubs or gallops.    Abdomen:  Soft. No tenderness. Non-distended. Bowels sounds are present   Extremities: Equal tibial pulses. No leg edema.   Skin: No rashes or ulcers. Warm, Dry.   Musculoskeletal: No tenderness. No deformity.   Neurologic: Mood and affect are appropriate. No focal deficits.         Pacemaker interrogation today: Personally reviewed  Normal device function, no cardiac arrhytmia    Lab Review   Lab Results   Component Value Date     01/27/2020    K 3.4 (L) 01/27/2020     01/27/2020    CO2 24 01/27/2020    BUN 21 01/27/2020    CREATININE 0.83 01/27/2020    CALCIUM 9.3 01/27/2020     Lab Results   Component Value Date    WBC 6.4 09/12/2011    HGB 13.9 09/12/2011    HCT 40.2 09/12/2011    MCV 89 09/12/2011     09/12/2011     Lab Results   Component Value Date    CHOL 175 01/27/2020    CHOL 194 01/15/2019    CHOL 183 01/04/2018     Lab Results   Component Value Date    HDL 64 01/27/2020    HDL 68 01/15/2019    HDL 64 01/04/2018     Lab Results   Component Value Date    LDLCALC 82 01/27/2020    LDLCALC 112 01/15/2019    LDLCALC 99 01/04/2018     Lab Results   Component Value Date    TRIG 147 01/27/2020    TRIG 69 01/15/2019    TRIG 100 01/04/2018

## 2021-07-04 NOTE — PROGRESS NOTES
Progress Notes by Mabel Hollingsworth MD at 6/16/2021  8:50 AM     Author: Mabel Hollingsworth MD Service: -- Author Type: Physician    Filed: 6/16/2021  9:09 AM Encounter Date: 6/16/2021 Status: Signed    : Mabel Hollingsworth MD (Physician)           Click to link to [facility] NOTE       Assessment/Plan:   An 89-year-old woman presents to clinic today for routine cardiology followup.     1. Second degree AV block, status post dual chamber pacemaker placement. The patient's pacemaker interrogation today indicated normal pacemaker function. She has no cardiac arrhythmia.      2. Essential hypertension. Her blood pressure has been controlled well. Continue current medications amlodipine, lisinopril-hydrochlorothiazide.    Thank you for the opportunity to be involved in the care of Siobhan Dawson. If you have any questions, please feel free to contact me.  I will see the patient again in 12 months and as needed.     History of Present Illness:   It is my pleasure to see Ms. Siobhan Dawson today at the [facility] for routine cardiology followup. Siobhan is an 89-year-old pleasant woman with a medical history of essential hypertension, hepatitis C carrier, second-degree AV block, status post dual-chamber pacemaker placement.     The patient states that she has been doing quite well over the last year.  She had COVID-19 vaccination.  She denies chest pain, shortness of breath, palpitations, dizziness, PND, or leg swelling.  Her blood pressure has been well controlled.    Her pacemaker interrogation in clinic today which was reported normal device function, no cardiac arrhythmia.      Past Medical History:     Patient Active Problem List   Diagnosis   ? Hypertension   ? S/P placement of cardiac pacemaker   ? Cadiac pacemaker in situ, dual chamber   ? 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 Second degree AV block       Past Surgical History:   History reviewed. No pertinent surgical  "history.    Family History:   History reviewed. No pertinent family history.     Social History:    reports that she has never smoked. She has never used smokeless tobacco. She reports that she does not use drugs.    Review of Systems:   General: WNL  Eyes: WNL  Ears/Nose/Throat: WNL  Lungs: WNL  Heart: WNL  Stomach: WNL  Bladder: Frequent Urination at Night  Muscle/Joints: WNL  Skin: WNL  Nervous System: Daytime Sleepiness  Mental Health: WNL     Blood: WNL    Meds:     Current Outpatient Medications:   ?  alendronate (FOSAMAX) 70 MG tablet, Take 70 mg by mouth once a week., Disp: , Rfl: 3  ?  amLODIPine (NORVASC) 2.5 MG tablet, Take 1 tablet daily, Disp: , Rfl:   ?  aspirin 81 mg chewable tablet, Chew 81 mg every other day. , Disp: , Rfl:   ?  lisinopril-hydrochlorothiazide (PRINZIDE,ZESTORETIC) 20-25 mg per tablet, Take 0.5 tablets by mouth daily. , Disp: , Rfl:   ?  multivitamin therapeutic (THERAGRAN) tablet, Take 1 tablet by mouth daily., Disp: , Rfl:   ?  potassium chloride 20 mEq TbER, Take 1 tablet by mouth daily., Disp: , Rfl:   ?  calcium carbonate-vit D3-min 600 mg calcium- 200 unit Tab, Take 1 tablet by mouth every other day. , Disp: , Rfl:     Allergies:   Patient has no known allergies.      Objective:      Physical Exam  117 lb (53.1 kg)  5' 4\" (1.626 m)  Body mass index is 20.08 kg/m .  /62 (Patient Site: Right Arm, Patient Position: Sitting, Cuff Size: Adult Small)   Pulse 66   Resp 16   Ht 5' 4\" (1.626 m)   Wt 117 lb (53.1 kg) Comment: With shoes.  BMI 20.08 kg/m      General Appearance:   Awake, Alert, No acute distress.   HEENT:  Pupil equal and reactive to light. No scleral icterus; the mucous membranes were moist.   Neck: No cervical bruits. No JVD. No thyromegaly.     Chest: The spine was straight. The chest was symmetric.   Lungs:   Respirations unlabored; Lungs are clear to auscultation. No crackles. No wheezing.   Cardiovascular:   Regular rhythm and rate, normal first and " second heart sounds with no murmurs. No rubs or gallops.    Abdomen:  Soft. No tenderness. Non-distended. Bowels sounds are present   Extremities: Equal tibial pulses. No leg edema.   Skin: No rashes or ulcers. Warm, Dry.   Musculoskeletal: No tenderness. No deformity.   Neurologic: Mood and affect are appropriate. No focal deficits.         Pacemaker interrogation today: Personally reviewed  Normal device function, no cardiac arrhythmia  5 years of battery left.    Lab Review   Lab Results   Component Value Date     02/08/2021    K 3.7 02/08/2021     02/08/2021    CO2 29 02/08/2021    BUN 23 02/08/2021    CREATININE 0.70 02/08/2021    CALCIUM 9.0 02/08/2021     Lab Results   Component Value Date    WBC 6.4 09/12/2011    HGB 13.9 09/12/2011    HCT 40.2 09/12/2011    MCV 89 09/12/2011     09/12/2011     Lab Results   Component Value Date    CHOL 180 02/08/2021    CHOL 175 01/27/2020    CHOL 194 01/15/2019     Lab Results   Component Value Date    HDL 67 02/08/2021    HDL 64 01/27/2020    HDL 68 01/15/2019     Lab Results   Component Value Date    LDLCALC 85 02/08/2021    LDLCALC 82 01/27/2020    LDLCALC 112 01/15/2019     Lab Results   Component Value Date    TRIG 138 02/08/2021    TRIG 147 01/27/2020    TRIG 69 01/15/2019

## 2021-07-06 VITALS
WEIGHT: 117 LBS | RESPIRATION RATE: 16 BRPM | DIASTOLIC BLOOD PRESSURE: 62 MMHG | BODY MASS INDEX: 19.97 KG/M2 | HEART RATE: 66 BPM | HEIGHT: 64 IN | SYSTOLIC BLOOD PRESSURE: 134 MMHG

## 2021-07-13 ENCOUNTER — RECORDS - HEALTHEAST (OUTPATIENT)
Dept: ADMINISTRATIVE | Facility: CLINIC | Age: 86
End: 2021-07-13

## 2021-07-21 ENCOUNTER — RECORDS - HEALTHEAST (OUTPATIENT)
Dept: ADMINISTRATIVE | Facility: CLINIC | Age: 86
End: 2021-07-21

## 2021-09-23 ENCOUNTER — ANCILLARY PROCEDURE (OUTPATIENT)
Dept: CARDIOLOGY | Facility: CLINIC | Age: 86
End: 2021-09-23
Attending: INTERNAL MEDICINE
Payer: MEDICARE

## 2021-09-23 DIAGNOSIS — Z95.0 S/P PLACEMENT OF CARDIAC PACEMAKER: ICD-10-CM

## 2021-09-23 DIAGNOSIS — I44.1 SECOND DEGREE AV BLOCK: Primary | ICD-10-CM

## 2021-09-23 PROCEDURE — 93293 PM PHONE R-STRIP DEVICE EVAL: CPT | Performed by: INTERNAL MEDICINE

## 2021-11-03 LAB
MDC_IDC_LEAD_IMPLANT_DT: NORMAL
MDC_IDC_LEAD_IMPLANT_DT: NORMAL
MDC_IDC_LEAD_LOCATION: NORMAL
MDC_IDC_LEAD_LOCATION: NORMAL
MDC_IDC_LEAD_LOCATION_DETAIL_1: NORMAL
MDC_IDC_LEAD_LOCATION_DETAIL_1: NORMAL
MDC_IDC_LEAD_MFG: NORMAL
MDC_IDC_LEAD_MFG: NORMAL
MDC_IDC_LEAD_MODEL: NORMAL
MDC_IDC_LEAD_MODEL: NORMAL
MDC_IDC_LEAD_POLARITY_TYPE: NORMAL
MDC_IDC_LEAD_POLARITY_TYPE: NORMAL
MDC_IDC_LEAD_SERIAL: NORMAL
MDC_IDC_LEAD_SERIAL: NORMAL
MDC_IDC_PG_IMPLANT_DTM: NORMAL
MDC_IDC_PG_MFG: NORMAL
MDC_IDC_PG_MODEL: NORMAL
MDC_IDC_PG_SERIAL: NORMAL
MDC_IDC_PG_TYPE: NORMAL
MDC_IDC_SESS_CLINIC_NAME: NORMAL
MDC_IDC_SESS_DTM: NORMAL
MDC_IDC_SESS_TYPE: NORMAL

## 2021-12-28 ENCOUNTER — ANCILLARY PROCEDURE (OUTPATIENT)
Dept: CARDIOLOGY | Facility: CLINIC | Age: 86
End: 2021-12-28
Attending: INTERNAL MEDICINE
Payer: MEDICARE

## 2021-12-28 DIAGNOSIS — Z95.0 CARDIAC PACEMAKER IN SITU: ICD-10-CM

## 2021-12-28 PROCEDURE — 93293 PM PHONE R-STRIP DEVICE EVAL: CPT | Performed by: INTERNAL MEDICINE

## 2022-02-07 ENCOUNTER — LAB REQUISITION (OUTPATIENT)
Dept: LAB | Facility: CLINIC | Age: 87
End: 2022-02-07
Payer: MEDICARE

## 2022-02-07 DIAGNOSIS — I10 ESSENTIAL (PRIMARY) HYPERTENSION: ICD-10-CM

## 2022-02-07 LAB
ALBUMIN SERPL-MCNC: 3.5 G/DL (ref 3.5–5)
ALP SERPL-CCNC: 44 U/L (ref 45–120)
ALT SERPL W P-5'-P-CCNC: 24 U/L (ref 0–45)
ANION GAP SERPL CALCULATED.3IONS-SCNC: 10 MMOL/L (ref 5–18)
AST SERPL W P-5'-P-CCNC: 31 U/L (ref 0–40)
BILIRUB SERPL-MCNC: 0.6 MG/DL (ref 0–1)
BUN SERPL-MCNC: 25 MG/DL (ref 8–28)
CALCIUM SERPL-MCNC: 9.2 MG/DL (ref 8.5–10.5)
CHLORIDE BLD-SCNC: 107 MMOL/L (ref 98–107)
CO2 SERPL-SCNC: 26 MMOL/L (ref 22–31)
CREAT SERPL-MCNC: 0.7 MG/DL (ref 0.6–1.1)
GFR SERPL CREATININE-BSD FRML MDRD: 82 ML/MIN/1.73M2
GLUCOSE BLD-MCNC: 91 MG/DL (ref 70–125)
POTASSIUM BLD-SCNC: 3.8 MMOL/L (ref 3.5–5)
PROT SERPL-MCNC: 5.9 G/DL (ref 6–8)
SODIUM SERPL-SCNC: 143 MMOL/L (ref 136–145)

## 2022-02-07 PROCEDURE — 80053 COMPREHEN METABOLIC PANEL: CPT | Mod: ORL | Performed by: FAMILY MEDICINE

## 2022-04-20 ENCOUNTER — ANCILLARY PROCEDURE (OUTPATIENT)
Dept: CARDIOLOGY | Facility: CLINIC | Age: 87
End: 2022-04-20
Attending: INTERNAL MEDICINE
Payer: MEDICARE

## 2022-04-20 DIAGNOSIS — Z95.0 PACEMAKER: ICD-10-CM

## 2022-04-20 DIAGNOSIS — I44.1 SECOND DEGREE AV BLOCK: Primary | ICD-10-CM

## 2022-04-20 LAB
MDC_IDC_LEAD_IMPLANT_DT: NORMAL
MDC_IDC_LEAD_IMPLANT_DT: NORMAL
MDC_IDC_LEAD_LOCATION: NORMAL
MDC_IDC_LEAD_LOCATION: NORMAL
MDC_IDC_LEAD_LOCATION_DETAIL_1: NORMAL
MDC_IDC_LEAD_LOCATION_DETAIL_1: NORMAL
MDC_IDC_LEAD_MFG: NORMAL
MDC_IDC_LEAD_MFG: NORMAL
MDC_IDC_LEAD_MODEL: NORMAL
MDC_IDC_LEAD_MODEL: NORMAL
MDC_IDC_LEAD_POLARITY_TYPE: NORMAL
MDC_IDC_LEAD_POLARITY_TYPE: NORMAL
MDC_IDC_LEAD_SERIAL: NORMAL
MDC_IDC_LEAD_SERIAL: NORMAL
MDC_IDC_MSMT_BATTERY_DTM: NORMAL
MDC_IDC_MSMT_BATTERY_REMAINING_LONGEVITY: 54 MO
MDC_IDC_MSMT_BATTERY_STATUS: NORMAL
MDC_IDC_MSMT_LEADCHNL_RA_IMPEDANCE_VALUE: 580 OHM
MDC_IDC_MSMT_LEADCHNL_RA_PACING_THRESHOLD_AMPLITUDE: 0.7 V
MDC_IDC_MSMT_LEADCHNL_RA_PACING_THRESHOLD_PULSEWIDTH: 0.4 MS
MDC_IDC_MSMT_LEADCHNL_RA_SENSING_INTR_AMPL: 1.5 MV
MDC_IDC_MSMT_LEADCHNL_RV_IMPEDANCE_VALUE: 430 OHM
MDC_IDC_MSMT_LEADCHNL_RV_PACING_THRESHOLD_AMPLITUDE: 0.7 V
MDC_IDC_MSMT_LEADCHNL_RV_PACING_THRESHOLD_PULSEWIDTH: 0.4 MS
MDC_IDC_MSMT_LEADCHNL_RV_SENSING_INTR_AMPL: 8.1 MV
MDC_IDC_PG_IMPLANT_DTM: NORMAL
MDC_IDC_PG_MFG: NORMAL
MDC_IDC_PG_MODEL: NORMAL
MDC_IDC_PG_SERIAL: NORMAL
MDC_IDC_PG_TYPE: NORMAL
MDC_IDC_SESS_CLINIC_NAME: NORMAL
MDC_IDC_SESS_DTM: NORMAL
MDC_IDC_SESS_TYPE: NORMAL
MDC_IDC_SET_BRADY_AT_MODE_SWITCH_MODE: NORMAL
MDC_IDC_SET_BRADY_AT_MODE_SWITCH_RATE: 170 {BEATS}/MIN
MDC_IDC_SET_BRADY_HYSTRATE: NORMAL
MDC_IDC_SET_BRADY_LOWRATE: 60 {BEATS}/MIN
MDC_IDC_SET_BRADY_MAX_TRACKING_RATE: 130 {BEATS}/MIN
MDC_IDC_SET_BRADY_MODE: NORMAL
MDC_IDC_SET_BRADY_PAV_DELAY_HIGH: 110 MS
MDC_IDC_SET_BRADY_PAV_DELAY_LOW: 300 MS
MDC_IDC_SET_BRADY_SAV_DELAY_LOW: 280 MS
MDC_IDC_SET_LEADCHNL_RA_PACING_AMPLITUDE: 1.5 V
MDC_IDC_SET_LEADCHNL_RA_PACING_CAPTURE_MODE: NORMAL
MDC_IDC_SET_LEADCHNL_RA_PACING_POLARITY: NORMAL
MDC_IDC_SET_LEADCHNL_RA_PACING_PULSEWIDTH: 0.4 MS
MDC_IDC_SET_LEADCHNL_RA_SENSING_ADAPTATION_MODE: NORMAL
MDC_IDC_SET_LEADCHNL_RA_SENSING_POLARITY: NORMAL
MDC_IDC_SET_LEADCHNL_RA_SENSING_SENSITIVITY: 0.25 MV
MDC_IDC_SET_LEADCHNL_RV_PACING_AMPLITUDE: NORMAL
MDC_IDC_SET_LEADCHNL_RV_PACING_CAPTURE_MODE: NORMAL
MDC_IDC_SET_LEADCHNL_RV_PACING_POLARITY: NORMAL
MDC_IDC_SET_LEADCHNL_RV_PACING_PULSEWIDTH: 0.4 MS
MDC_IDC_SET_LEADCHNL_RV_SENSING_ADAPTATION_MODE: NORMAL
MDC_IDC_SET_LEADCHNL_RV_SENSING_POLARITY: NORMAL
MDC_IDC_SET_LEADCHNL_RV_SENSING_SENSITIVITY: 2.5 MV
MDC_IDC_STAT_AT_DTM_END: NORMAL
MDC_IDC_STAT_AT_DTM_START: NORMAL
MDC_IDC_STAT_AT_MODE_SW_COUNT: 0
MDC_IDC_STAT_AT_MODE_SW_MAX_DURATION: 0 S
MDC_IDC_STAT_BRADY_DTM_END: NORMAL
MDC_IDC_STAT_BRADY_DTM_START: NORMAL
MDC_IDC_STAT_BRADY_RA_PERCENT_PACED: 20 %
MDC_IDC_STAT_BRADY_RV_PERCENT_PACED: 1 %
MDC_IDC_STAT_EPISODE_RECENT_COUNT: 0
MDC_IDC_STAT_EPISODE_RECENT_COUNT_DTM_END: NORMAL
MDC_IDC_STAT_EPISODE_RECENT_COUNT_DTM_START: NORMAL
MDC_IDC_STAT_EPISODE_TYPE: NORMAL
MDC_IDC_STAT_EPISODE_VENDOR_TYPE: NORMAL

## 2022-04-20 PROCEDURE — 93280 PM DEVICE PROGR EVAL DUAL: CPT | Performed by: INTERNAL MEDICINE

## 2022-05-03 ENCOUNTER — ANCILLARY PROCEDURE (OUTPATIENT)
Dept: BONE DENSITY | Facility: CLINIC | Age: 87
End: 2022-05-03
Attending: FAMILY MEDICINE
Payer: MEDICARE

## 2022-05-03 DIAGNOSIS — M81.0 AGE-RELATED OSTEOPOROSIS WITHOUT CURRENT PATHOLOGICAL FRACTURE: ICD-10-CM

## 2022-05-03 DIAGNOSIS — Z78.0 POST-MENOPAUSAL: ICD-10-CM

## 2022-05-03 PROCEDURE — 77080 DXA BONE DENSITY AXIAL: CPT | Mod: TC | Performed by: INTERNAL MEDICINE

## 2022-05-03 PROCEDURE — 99207 DX HIP/PELVIS/SPINE: CPT | Performed by: INTERNAL MEDICINE

## 2022-05-03 PROCEDURE — 77081 DXA BONE DENSITY APPENDICULR: CPT | Mod: TC | Performed by: INTERNAL MEDICINE

## 2022-06-16 ENCOUNTER — LAB REQUISITION (OUTPATIENT)
Dept: LAB | Facility: CLINIC | Age: 87
End: 2022-06-16
Payer: MEDICARE

## 2022-06-16 DIAGNOSIS — E03.9 HYPOTHYROIDISM, UNSPECIFIED: ICD-10-CM

## 2022-06-16 DIAGNOSIS — D64.9 ANEMIA, UNSPECIFIED: ICD-10-CM

## 2022-06-16 DIAGNOSIS — I10 ESSENTIAL (PRIMARY) HYPERTENSION: ICD-10-CM

## 2022-06-16 DIAGNOSIS — E55.9 VITAMIN D DEFICIENCY, UNSPECIFIED: ICD-10-CM

## 2022-06-30 ENCOUNTER — LAB REQUISITION (OUTPATIENT)
Dept: LAB | Facility: CLINIC | Age: 87
End: 2022-06-30
Payer: MEDICARE

## 2022-06-30 DIAGNOSIS — E03.9 HYPOTHYROIDISM, UNSPECIFIED: ICD-10-CM

## 2022-06-30 DIAGNOSIS — E55.9 VITAMIN D DEFICIENCY, UNSPECIFIED: ICD-10-CM

## 2022-06-30 DIAGNOSIS — I10 ESSENTIAL (PRIMARY) HYPERTENSION: ICD-10-CM

## 2022-06-30 DIAGNOSIS — D64.9 ANEMIA, UNSPECIFIED: ICD-10-CM

## 2022-07-01 LAB
ANION GAP SERPL CALCULATED.3IONS-SCNC: 13 MMOL/L (ref 7–15)
BUN SERPL-MCNC: 31.5 MG/DL (ref 8–23)
CALCIUM SERPL-MCNC: 9.4 MG/DL (ref 8.2–9.6)
CHLORIDE SERPL-SCNC: 102 MMOL/L (ref 98–107)
CREAT SERPL-MCNC: 0.83 MG/DL (ref 0.51–0.95)
DEPRECATED HCO3 PLAS-SCNC: 26 MMOL/L (ref 22–29)
ERYTHROCYTE [DISTWIDTH] IN BLOOD BY AUTOMATED COUNT: 12 % (ref 10–15)
GFR SERPL CREATININE-BSD FRML MDRD: 67 ML/MIN/1.73M2
GLUCOSE SERPL-MCNC: 97 MG/DL (ref 70–99)
HCT VFR BLD AUTO: 44.5 % (ref 35–47)
HGB BLD-MCNC: 14.4 G/DL (ref 11.7–15.7)
MCH RBC QN AUTO: 30.2 PG (ref 26.5–33)
MCHC RBC AUTO-ENTMCNC: 32.4 G/DL (ref 31.5–36.5)
MCV RBC AUTO: 93 FL (ref 78–100)
PLATELET # BLD AUTO: 167 10E3/UL (ref 150–450)
POTASSIUM SERPL-SCNC: 3.8 MMOL/L (ref 3.4–5.3)
RBC # BLD AUTO: 4.77 10E6/UL (ref 3.8–5.2)
SODIUM SERPL-SCNC: 141 MMOL/L (ref 136–145)
TSH SERPL DL<=0.005 MIU/L-ACNC: 0.65 UIU/ML (ref 0.3–4.2)
WBC # BLD AUTO: 7.4 10E3/UL (ref 4–11)

## 2022-07-01 PROCEDURE — 80048 BASIC METABOLIC PNL TOTAL CA: CPT | Mod: ORL | Performed by: NURSE PRACTITIONER

## 2022-07-01 PROCEDURE — 36415 COLL VENOUS BLD VENIPUNCTURE: CPT | Mod: ORL | Performed by: NURSE PRACTITIONER

## 2022-07-01 PROCEDURE — 84443 ASSAY THYROID STIM HORMONE: CPT | Performed by: NURSE PRACTITIONER

## 2022-07-01 PROCEDURE — P9603 ONE-WAY ALLOW PRORATED MILES: HCPCS | Mod: ORL | Performed by: NURSE PRACTITIONER

## 2022-07-01 PROCEDURE — 82306 VITAMIN D 25 HYDROXY: CPT | Performed by: NURSE PRACTITIONER

## 2022-07-01 PROCEDURE — 85014 HEMATOCRIT: CPT | Performed by: NURSE PRACTITIONER

## 2022-07-04 LAB — DEPRECATED CALCIDIOL+CALCIFEROL SERPL-MC: 33 UG/L (ref 20–75)

## 2022-07-27 ENCOUNTER — ANCILLARY PROCEDURE (OUTPATIENT)
Dept: CARDIOLOGY | Facility: CLINIC | Age: 87
End: 2022-07-27
Attending: INTERNAL MEDICINE
Payer: MEDICARE

## 2022-07-27 DIAGNOSIS — Z95.0 PACEMAKER: ICD-10-CM

## 2022-07-27 DIAGNOSIS — I44.1 SECOND DEGREE AV BLOCK: ICD-10-CM

## 2022-07-27 PROCEDURE — 93293 PM PHONE R-STRIP DEVICE EVAL: CPT | Performed by: INTERNAL MEDICINE

## 2022-11-02 ENCOUNTER — ANCILLARY PROCEDURE (OUTPATIENT)
Dept: CARDIOLOGY | Facility: CLINIC | Age: 87
End: 2022-11-02
Payer: MEDICARE

## 2022-11-02 DIAGNOSIS — Z95.0 PACEMAKER: ICD-10-CM

## 2022-11-02 DIAGNOSIS — I44.1 SECOND DEGREE AV BLOCK: ICD-10-CM

## 2022-11-02 PROCEDURE — 93293 PM PHONE R-STRIP DEVICE EVAL: CPT | Performed by: INTERNAL MEDICINE

## 2023-01-16 ENCOUNTER — TRANSITIONAL CARE UNIT VISIT (OUTPATIENT)
Dept: GERIATRICS | Facility: CLINIC | Age: 88
End: 2023-01-16
Payer: MEDICARE

## 2023-01-16 ENCOUNTER — LAB REQUISITION (OUTPATIENT)
Dept: LAB | Facility: CLINIC | Age: 88
End: 2023-01-16
Payer: MEDICARE

## 2023-01-16 VITALS
HEIGHT: 64 IN | WEIGHT: 114.2 LBS | TEMPERATURE: 98.2 F | HEART RATE: 96 BPM | DIASTOLIC BLOOD PRESSURE: 80 MMHG | BODY MASS INDEX: 19.5 KG/M2 | RESPIRATION RATE: 19 BRPM | SYSTOLIC BLOOD PRESSURE: 145 MMHG | OXYGEN SATURATION: 97 %

## 2023-01-16 DIAGNOSIS — E87.6 HYPOKALEMIA: ICD-10-CM

## 2023-01-16 DIAGNOSIS — Z95.0 CARDIAC PACEMAKER IN SITU: ICD-10-CM

## 2023-01-16 DIAGNOSIS — M47.16 LUMBAR SPONDYLOSIS WITH MYELOPATHY: ICD-10-CM

## 2023-01-16 DIAGNOSIS — E43 SEVERE PROTEIN-CALORIE MALNUTRITION (H): ICD-10-CM

## 2023-01-16 DIAGNOSIS — I44.1 SECOND DEGREE AV BLOCK: ICD-10-CM

## 2023-01-16 DIAGNOSIS — M48.061 SPINAL STENOSIS OF LUMBAR REGION WITHOUT NEUROGENIC CLAUDICATION: ICD-10-CM

## 2023-01-16 DIAGNOSIS — R29.898 LEFT LEG WEAKNESS: Primary | ICD-10-CM

## 2023-01-16 DIAGNOSIS — M81.0 AGE-RELATED OSTEOPOROSIS WITHOUT CURRENT PATHOLOGICAL FRACTURE: ICD-10-CM

## 2023-01-16 DIAGNOSIS — R63.4 WEIGHT LOSS: ICD-10-CM

## 2023-01-16 DIAGNOSIS — I10 ESSENTIAL (PRIMARY) HYPERTENSION: ICD-10-CM

## 2023-01-16 PROBLEM — E78.5 HYPERLIPIDEMIA, UNSPECIFIED: Status: ACTIVE | Noted: 2023-01-16

## 2023-01-16 PROCEDURE — 99418 PROLNG IP/OBS E/M EA 15 MIN: CPT | Performed by: FAMILY MEDICINE

## 2023-01-16 PROCEDURE — 99306 1ST NF CARE HIGH MDM 50: CPT | Performed by: FAMILY MEDICINE

## 2023-01-16 RX ORDER — IBUPROFEN 200 MG
200 TABLET ORAL EVERY 8 HOURS PRN
COMMUNITY
End: 2024-06-03

## 2023-01-16 RX ORDER — ATORVASTATIN CALCIUM 40 MG/1
40 TABLET, FILM COATED ORAL DAILY
COMMUNITY
End: 2024-06-03

## 2023-01-16 RX ORDER — ACETAMINOPHEN 500 MG
500 TABLET ORAL EVERY 6 HOURS PRN
COMMUNITY

## 2023-01-16 NOTE — LETTER
2023        RE: Siobhan Dawson  [address]  [place] [address]        [facility] GERIATRICS    PRIMARY CARE PROVIDER AND CLINIC:  [facility] Hall [facility] [address] [facility] [place] [address]  Chief Complaint   Patient presents with     Hospital F/U      [facility] Record Number:  [id number]  Place of Service where encounter took place:  [facility] (SNF) [[id number]]    Siobhan Dawson  is a 89 year old, Sister of gee Tillman (10/23/1931), with pmhx including osteoporosis (on alendronate), HTN, 2nd deg AV block s/p PPM ()  admitted to the above facility from  [facility] . Hospital stay 23 through 23.     HPI:      Hospital course  She recalls her admission well.  She had been doing one half of her regular walks when she noticed significant weakness in her left leg with associated left foot drop the morning prior to presentation.  She had been seen at [facility] the day prior to admission (information not currently available) and sounds as though plan for CT as well as EMG --given prior visits, this was likely related to her prior wrist fracture and some concern for possible carpal tunnel syndrome.    At time of presentation, she had no change in severity of her foot drop.  Described as feeling heavy.  No significant loss of sensation or tingling.  Denied any saddle anesthesia.  No loss of bowel or bladder function.  No recent fevers or chills, nausea vomiting, abdominal pain, or other infectious symptoms.    Initial evaluation with normal BMP, CBC with elevated RBC, no leukocytosis, appearance of chronic thrombocytopenia.  COVID-19 testing was negative.  Troponins were negative.  She underwent CT imaging of the head on presentation without acute findings, did note severe chronic small vessel ischemic changes, perivascular space in the right basal ganglia, moderate generalized volume loss without hydrocephalus.  Further underwent CT of the lumbar  spine with multilevel spondylosis with severe lumbar levocurvature with associated severe canal stenosis at L2/3, L3/4, L4/5 as well as severe left L3/4 and right L1/2 foraminal stenosis.    She was evaluated by neurosurgery, however it did not wish to undergo any surgical interventions.  They did recommend CT myelogram which again demonstrated multilevel degeneration with areas of severe narrowing with associated ligamentum flavum thickening, particularly at L3/4 left facet with associated severe spinal canal stenosis.    She did undergo therapies with improvement in movements with recommendation to discharge to TCU.    Today  Today, she notes some improvement.  She is able to lift her leg off the floor as she is seated in the chair.  She has been walking with a 2 wheel walker with therapy but does feel like her foot continues to drag.  She continues to have significant difficulty with dorsi and plantar flexion of her foot.  She does not have any numbness.  No pain in her leg.  No pain in her back except when lying for prolonged periods.  For this reason she does sleep in a chair at home.  She has Tylenol and low-dose ibuprofen as needed for pain, but again primarily has this at night.  She has had no similar symptoms in her right leg.    She has had no recent falls.  She did break her right wrist approximately 1 year ago, has been following orthopedic surgery for this.  Again declined surgical intervention at the time.  She has undergone healing and sounds as though orthopedic surgery expects her to have a chronic deformity. She continues to have decreased sensation of her right middle finger. She says she broke it not by falling but when she was reaching down to  a paper and had put weight on her arm.  She does continue on alendronate which she has been on since 2019.  Her last DEXA was in May 2022.    She lives in [facility] independently.  She decided to stop driving at the age of 89, said she  just felt it was time to stop.  With this, she does get assistance with shopping and groceries.  Otherwise she is independent in all her activities.  She does wear hearing aids.  Denies any incontinence.  She goes on walks for about 1 hour almost daily, typically uses a four-wheel walker at home.    She has no chest pain or pressure, no shortness of breath.  She had a pacer placed approximate 11 years ago for second-degree AV block.  Has been functioning appropriately.    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.  She is not a children with this.  Has good supports with them.    CODE STATUS/ADVANCE DIRECTIVES DISCUSSION:  No Order  DNR / DNI  ALLERGIES: No Known Allergies   PAST MEDICAL HISTORY: No past medical history on file.   PAST SURGICAL HISTORY:   has no past surgical history on file.  FAMILY HISTORY: family history is not on file.  SOCIAL HISTORY:   reports that she has never smoked. She has never used smokeless tobacco. She reports that she does not use drugs.  Patient's living condition: lives alone    Post Discharge Medication Reconciliation Status:   MED REC REQUIRED  Post Medication Reconciliation Status: discharge medications reconciled, continue medications without change       Current Outpatient Medications   Medication Sig     acetaminophen (TYLENOL) 500 MG tablet Take 500 mg by mouth every 6 hours as needed for mild pain     alendronate (FOSAMAX) 70 MG tablet [ALENDRONATE (FOSAMAX) 70 MG TABLET] Take 70 mg by mouth once a week.     amLODIPine (NORVASC) 2.5 MG tablet [AMLODIPINE (NORVASC) 2.5 MG TABLET] Take 1 tablet daily     aspirin (ASA) 81 MG EC tablet Take 81 mg by mouth daily     atorvastatin (LIPITOR) 40 MG tablet Take 40 mg by mouth daily     camphor-menthol (DERMASARRA) 0.5-0.5 % external lotion Apply topically 4 times daily as needed for skin care     ibuprofen (ADVIL/MOTRIN) 200 MG tablet Take 200 mg by mouth every 8 hours as needed for pain      "lisinopril-hydrochlorothiazide (PRINZIDE,ZESTORETIC) 20-25 mg per tablet Take 1 tablet by mouth daily     multivitamin therapeutic (THERAGRAN) tablet [MULTIVITAMIN THERAPEUTIC (THERAGRAN) TABLET] Take 1 tablet by mouth daily.     potassium chloride 20 mEq TbER [POTASSIUM CHLORIDE 20 MEQ TBER] Take 1 tablet by mouth daily.     No current facility-administered medications for this visit.       Vitals:  BP (!) 145/80   Pulse 96   Temp 98.2  F (36.8  C)   Resp 19   Ht 1.626 m (5' 4\")   Wt 51.8 kg (114 lb 3.2 oz)   SpO2 97%   BMI 19.60 kg/m    Exam:  GENERAL APPEARANCE: Seated comfortably, NAD  HENT:  Mild temporal atrophy, moderately Alutiiq -- wears hearing aids, good dentition  EYES:  Conjunctiva clear, anicteric, EOMI, PERRL  PULM  Normal WOB on RA, lungs CTAB, no wheezes or crackles, good air movement  CV:  RRR, S1/S2 normal, no murmurs; no LE edema  ABDOMEN: Abdomen soft, not tender, not distended, BS normal and active throughout   M/S:   Generalized atrophy, normal AROM upper extremities; LLE with AFO; Right wrist with ulnar prominence and mild dorsal angulation.   NEURO: Alert, answering questions appropriately, normal thought processes; CN II-XII grossly intact, LLE strength -- 0/5 in plantar flexion, 3+/5 knee flexion and extension, 3/5 hip flexion; rest 5/5 strength. Normal sensation throughout. Decreased bulk, normal tone.  -Cognition intact    Lab/Diagnostic data:  Recent labs/imaging in [facility] reviewed by me today.     EXAM: CT THORACIC/LUMBAR SPINE W IV CONT   LOCATION: [facility]   DATE/TIME: 1/12/2023 4:34 PM     INDICATION: Myelopathy, acute.   COMPARISON: CT lumbar spine 1/11/2023.   TECHNIQUE:   1) Routine CT Thoracic Spine with intrathecal contrast administered in a separate procedure. Multiplanar reformats. Dose reduction techniques were used.   2) Routine CT Lumbar Spine with intrathecal contrast administered in a separate procedure. Multiplanar reformats. Dose reduction techniques were " used.     FINDINGS:     THORACIC SPINE CT:   Rightward scoliosis by 35 degrees centered at T7-T8. Otherwise normal alignment. Normal vertebral body heights. Mild to moderate disc space narrowing preferentially along the concave margin scoliosis with mild marginal osteophytes. Shallow disc protrusions within the mid thoracic spine. Mild facet arthropathy at T3-T4 and T4-T5 and T10-T11 and T11-T12. No spinal canal stenosis. Mild right foraminal stenosis at T11-T12 and T12-L1. Normal myelographic appearance of the spinal cord.     Left-sided pacer device partially visualized. Minimal scattered calcified plaque within the thoracic aorta.     LUMBAR SPINE CT:   Nomenclature is based on 5 lumbar type vertebral bodies. Moderate leftward scoliosis of the upper lumbar spine and rightward scoliosis centered at L4. Mild posterior spondylolisthesis at L2-L3 and L3-L4. Otherwise normal alignment. Normal vertebral body heights. Severe disc space narrowing on the right at L1-L2 and L2-L3, with level specific analysis below. Normal appearance of the distal spinal cord with conus medullaris terminating at inferior L1 level. Intrathecal air at the L4-L5 levels. Scattered calcified plaque within the abdominal aorta. Unremarkable visualized bony pelvis.     T12-L1: Mild disc space narrowing with mild marginal osteophyte. Mild facet arthropathy. Mild spinal canal narrowing. Mild bilateral foraminal stenoses.     L1-L2: Severe right-sided disc space narrowing with disc bulge and circumferential osteophyte on the right. Moderate right-sided facet arthropathy. Mild spinal canal stenosis with moderate right lateral recess stenosis and severe right foraminal stenosis. No left foraminal stenosis.     L2-L3: Severe disc space narrowing asymmetric on the right with circumferential osteophyte. Moderate facet arthropathy and ligamentum flavum thickening. Moderate spinal canal stenosis. Moderate to severe right foraminal stenosis. Mild left  foraminal stenosis.     L3-L4: Mild disc space narrowing with moderate disc bulge. Severe left facet arthropathy with severe ligamentum flavum thickening. These factors combine to result in severe spinal canal stenosis. Moderate left and mild right foraminal stenoses.     L4-L5: Mild disc space narrowing with mild to moderate disc bulge. Moderate facet arthropathy with ligamentum flavum thickening. Moderate spinal canal stenosis. Mild bilateral foraminal stenoses.     L5-S1: Normal disc height. Moderate to severe left-sided facet arthropathy. No spinal canal stenosis. Mild right foraminal stenosis. No left foraminal stenosis.     IMPRESSION:   THORACIC SPINE CT:   1.  Thoracic scoliosis and with mild spondylosis. No spinal canal stenosis. Mild to moderate foraminal stenoses at the thoracolumbar junction.     LUMBAR SPINE CT:   1.  Lumbar scoliosis and advanced spondylosis as detailed.   2.  Severe spinal canal stenosis at L3-L4, moderate spinal canal stenosis at L2-L3 and L4-L5, and mild spinal canal stenosis at L1-L2.   3.  Severe foraminal stenosis right L1-L2, moderate to severe foraminal stenosis right L2-L3, moderate foraminal stenosis left L3-L4. Otherwise mild foraminal narrowing.     ASSESSMENT/PLAN:    (R23.919) Left leg weakness  (primary encounter diagnosis)  Comment: Related to multilevel lumbar spine stenosis with associated areas of significant central canal stenosis, foraminal stenosis, and facet arthropathy.  CT myelogram with changes relatively consistent with her symptoms.  Was evaluated with neurosurgery with no plan for surgical intervention, will undergo conservative pain control and consideration for injection therapy.  Additionally was seen by neurology with consideration for EMG and other evaluation, will follow-up as outpatient to further evaluate.  Plan:   -PT/OT  -Tylenol and ibuprofen as needed  -Follow-up with neurology as planned.  Consideration for B12 deficiency as etiology of her  weakness, however seems more likely to be related to stenosis and will defer B12 lab at this time.    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  She does sleep in a chair due to associated back pain when she lies down for prolonged period.  Nonsurgical interventions as above and is continuing to improve with current therapies.    (E43) Severe protein-calorie malnutrition (H)  Comment: Significantly low weight with evidence of decreased bulk and likely sarcopenia.  No symptoms of dysphagia.  Likely multifactorial.  Was receiving supplements in the hospital.  Will be seen by RD and to evaluate further.  Plan:  -CMP    (R63.4) Weight loss  Comment: Chronically losing weight over past 2 years.  Was 125 pounds at the end of 2020, 117 pounds June 2021, currently 114 pounds.  Related to malnutrition.    (M81.0) Age-related osteoporosis without current pathological fracture  Comment: Associated with wrist fracture approximately 1 year ago from placing weight on her arm. DEXA scan 5/2022 with significantly negative T-score -4.1 and significant bone loss since prior scan in 2007. Has been followed with orthopedic surgery and with chronic deformity of her wrist. Likely increased risk related to malnutrition  Plan:   -Continue alendronate 70mg weekly  -With fracture while on bisphosphonate, consider transition to anabolic agent    (I10) Essential (primary) hypertension  Comment: Blood pressures reasonably controlled with age and function.  Continue without change at this time.  Plan:   -Routine monitoring at facility    (Z95.0) Cadiac pacemaker in situ, dual chamber  (I44.1) Second degree AV block  Comment: Regular rate on exam, functioning appropriately.  Last seen by cardiology in June 2021.    (E87.6) Hypokalemia  Comment: Likely  related to hydrochlorothiazide of Zestoretic.  She is on potassium supplement.  Was slightly low at time of discharge.  Plan:   -CMP    Orders:  -CMP next lab day    67 MINUTES SPENT BY ME on the date of service doing chart review, history, exam, documentation & further activities per the note.     Electronically signed by:    Benjamin Rosenstein, MD, MA  [facility]    This note was completed with the assistance of dictation software. Typos and word substitution-errors are expected and unintended.                        Sincerely,        Benjamin Rosenstein, MD

## 2023-01-16 NOTE — PROGRESS NOTES
[facility]    PRIMARY CARE PROVIDER AND CLINIC:  Jessika Hall MD, [id number] [facility] / [place] [address]  Chief Complaint   Patient presents with     Hospital F/U      [place] Medical Record Number:  [id number]  Place of Service where encounter took place:  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 Hospital stay 23 through 23.     HPI:      Hospital course  She recalls her admission well.  She had been doing one half of her regular walks when she noticed significant weakness in her left leg with associated left foot drop the morning prior to presentation.  She had been seen at [facility] the day prior to admission (information not currently available) and sounds as though plan for CT as well as EMG --given prior visits, this was likely related to her prior wrist fracture and some concern for possible carpal tunnel syndrome.    At time of presentation, she had no change in severity of her foot drop.  Described as feeling heavy.  No significant loss of sensation or tingling.  Denied any saddle anesthesia.  No loss of bowel or bladder function.  No recent fevers or chills, nausea vomiting, abdominal pain, or other infectious symptoms.    Initial evaluation with normal BMP, CBC with elevated RBC, no leukocytosis, appearance of chronic thrombocytopenia.  COVID-19 testing was negative.  Troponins were negative.  She underwent CT imaging of the head on presentation without acute findings, did note severe chronic small vessel ischemic changes, perivascular space in the right basal ganglia, moderate generalized volume loss without hydrocephalus.  Further underwent CT of the lumbar spine with multilevel spondylosis with severe lumbar levocurvature with associated severe  canal stenosis at L2/3, L3/4, L4/5 as well as severe left L3/4 and right L1/2 foraminal stenosis.    She was evaluated by neurosurgery, however it did not wish to undergo any surgical interventions.  They did recommend CT myelogram which again demonstrated multilevel degeneration with areas of severe narrowing with associated ligamentum flavum thickening, particularly at L3/4 left facet with associated severe spinal canal stenosis.    She did undergo therapies with improvement in movements with recommendation to discharge to TCU.    Today  Today, she notes some improvement.  She is able to lift her leg off the floor as she is seated in the chair.  She has been walking with a 2 wheel walker with therapy but does feel like her foot continues to drag.  She continues to have significant difficulty with dorsi and plantar flexion of her foot.  She does not have any numbness.  No pain in her leg.  No pain in her back except when lying for prolonged periods.  For this reason she does sleep in a chair at home.  She has Tylenol and low-dose ibuprofen as needed for pain, but again primarily has this at night.  She has had no similar symptoms in her right leg.    She has had no recent falls.  She did break her right wrist approximately 1 year ago, has been following orthopedic surgery for this.  Again declined surgical intervention at the time.  She has undergone healing and sounds as though orthopedic surgery expects her to have a chronic deformity. She continues to have decreased sensation of her right middle finger. She says she broke it not by falling but when she was reaching down to  a paper and had put weight on her arm.  She does continue on alendronate which she has been on since 2019.  Her last DEXA was in May 2022.    She lives in [place] [facility] independently.  She decided to stop driving at the age of 89, said she just felt it was time to stop.  With this, she does get assistance with shopping and  groceries.  Otherwise she is independent in all her activities.  She does wear hearing aids.  Denies any incontinence.  She goes on walks for about 1 hour almost daily, typically uses a four-wheel walker at home.    She has no chest pain or pressure, no shortness of breath.  She had a pacer placed approximate 11 years ago for second-degree AV block.  Has been functioning appropriately.    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.  She is not a children with this.  Has good supports with them.    CODE STATUS/ADVANCE DIRECTIVES DISCUSSION:  No Order  DNR / DNI  ALLERGIES: No Known Allergies   PAST MEDICAL HISTORY: No past medical history on file.   PAST SURGICAL HISTORY:   has no past surgical history on file.  FAMILY HISTORY: family history is not on file.  SOCIAL HISTORY:   reports that she has never smoked. She has never used smokeless tobacco. She reports that she does not use drugs.  Patient's living condition: lives alone    Post Discharge Medication Reconciliation Status:   MED REC REQUIRED  Post Medication Reconciliation Status: discharge medications reconciled, continue medications without change       Current Outpatient Medications   Medication Sig     acetaminophen (TYLENOL) 500 MG tablet Take 500 mg by mouth every 6 hours as needed for mild pain     alendronate (FOSAMAX) 70 MG tablet [ALENDRONATE (FOSAMAX) 70 MG TABLET] Take 70 mg by mouth once a week.     amLODIPine (NORVASC) 2.5 MG tablet [AMLODIPINE (NORVASC) 2.5 MG TABLET] Take 1 tablet daily     aspirin (ASA) 81 MG EC tablet Take 81 mg by mouth daily     atorvastatin (LIPITOR) 40 MG tablet Take 40 mg by mouth daily     camphor-menthol (DERMASARRA) 0.5-0.5 % external lotion Apply topically 4 times daily as needed for skin care     ibuprofen (ADVIL/MOTRIN) 200 MG tablet Take 200 mg by mouth every 8 hours as needed for pain     lisinopril-hydrochlorothiazide (PRINZIDE,ZESTORETIC) 20-25 mg per tablet Take 1 tablet by mouth  "daily     multivitamin therapeutic (THERAGRAN) tablet [MULTIVITAMIN THERAPEUTIC (THERAGRAN) TABLET] Take 1 tablet by mouth daily.     potassium chloride 20 mEq TbER [POTASSIUM CHLORIDE 20 MEQ TBER] Take 1 tablet by mouth daily.     No current facility-administered medications for this visit.       Vitals:  BP (!) 145/80   Pulse 96   Temp 98.2  F (36.8  C)   Resp 19   Ht 1.626 m (5' 4\")   Wt 51.8 kg (114 lb 3.2 oz)   SpO2 97%   BMI 19.60 kg/m    Exam:  GENERAL APPEARANCE: Seated comfortably, NAD  HENT:  Mild temporal atrophy, moderately [place] -- wears hearing aids, good dentition  EYES:  Conjunctiva clear, anicteric, EOMI, PERRL  PULM  Normal WOB on RA, lungs CTAB, no wheezes or crackles, good air movement  CV:  RRR, S1/S2 normal, no murmurs; no LE edema  ABDOMEN: Abdomen soft, not tender, not distended, BS normal and active throughout   M/S:   Generalized atrophy, normal AROM upper extremities; LLE with AFO; Right wrist with ulnar prominence and mild dorsal angulation.   NEURO: Alert, answering questions appropriately, normal thought processes; CN II-XII grossly intact, LLE strength -- 0/5 in plantar flexion, 3+/5 knee flexion and extension, 3/5 hip flexion; rest 5/5 strength. Normal sensation throughout. Decreased bulk, normal tone.  -Cognition intact    Lab/Diagnostic data:  Recent labs/imaging in [facility] reviewed by me today.     EXAM: CT THORACIC/LUMBAR SPINE W IV CONT   LOCATION: [facility]   DATE/TIME: 1/12/2023 4:34 PM     INDICATION: Myelopathy, acute.   COMPARISON: CT lumbar spine 1/11/2023.   TECHNIQUE:   1) Routine CT Thoracic Spine with intrathecal contrast administered in a separate procedure. Multiplanar reformats. Dose reduction techniques were used.   2) Routine CT Lumbar Spine with intrathecal contrast administered in a separate procedure. Multiplanar reformats. Dose reduction techniques were used.     FINDINGS:     THORACIC SPINE CT:   Rightward scoliosis by 35 degrees centered at T7-T8. " Otherwise normal alignment. Normal vertebral body heights. Mild to moderate disc space narrowing preferentially along the concave margin scoliosis with mild marginal osteophytes. Shallow disc protrusions within the mid thoracic spine. Mild facet arthropathy at T3-T4 and T4-T5 and T10-T11 and T11-T12. No spinal canal stenosis. Mild right foraminal stenosis at T11-T12 and T12-L1. Normal myelographic appearance of the spinal cord.     Left-sided pacer device partially visualized. Minimal scattered calcified plaque within the thoracic aorta.     LUMBAR SPINE CT:   Nomenclature is based on 5 lumbar type vertebral bodies. Moderate leftward scoliosis of the upper lumbar spine and rightward scoliosis centered at L4. Mild posterior spondylolisthesis at L2-L3 and L3-L4. Otherwise normal alignment. Normal vertebral body heights. Severe disc space narrowing on the right at L1-L2 and L2-L3, with level specific analysis below. Normal appearance of the distal spinal cord with conus medullaris terminating at inferior L1 level. Intrathecal air at the L4-L5 levels. Scattered calcified plaque within the abdominal aorta. Unremarkable visualized bony pelvis.     T12-L1: Mild disc space narrowing with mild marginal osteophyte. Mild facet arthropathy. Mild spinal canal narrowing. Mild bilateral foraminal stenoses.     L1-L2: Severe right-sided disc space narrowing with disc bulge and circumferential osteophyte on the right. Moderate right-sided facet arthropathy. Mild spinal canal stenosis with moderate right lateral recess stenosis and severe right foraminal stenosis. No left foraminal stenosis.     L2-L3: Severe disc space narrowing asymmetric on the right with circumferential osteophyte. Moderate facet arthropathy and ligamentum flavum thickening. Moderate spinal canal stenosis. Moderate to severe right foraminal stenosis. Mild left foraminal stenosis.     L3-L4: Mild disc space narrowing with moderate disc bulge. Severe left facet  arthropathy with severe ligamentum flavum thickening. These factors combine to result in severe spinal canal stenosis. Moderate left and mild right foraminal stenoses.     L4-L5: Mild disc space narrowing with mild to moderate disc bulge. Moderate facet arthropathy with ligamentum flavum thickening. Moderate spinal canal stenosis. Mild bilateral foraminal stenoses.     L5-S1: Normal disc height. Moderate to severe left-sided facet arthropathy. No spinal canal stenosis. Mild right foraminal stenosis. No left foraminal stenosis.     IMPRESSION:   THORACIC SPINE CT:   1.  Thoracic scoliosis and with mild spondylosis. No spinal canal stenosis. Mild to moderate foraminal stenoses at the thoracolumbar junction.     LUMBAR SPINE CT:   1.  Lumbar scoliosis and advanced spondylosis as detailed.   2.  Severe spinal canal stenosis at L3-L4, moderate spinal canal stenosis at L2-L3 and L4-L5, and mild spinal canal stenosis at L1-L2.   3.  Severe foraminal stenosis right L1-L2, moderate to severe foraminal stenosis right L2-L3, moderate foraminal stenosis left L3-L4. Otherwise mild foraminal narrowing.     ASSESSMENT/PLAN:    (R26.481) Left leg weakness  (primary encounter diagnosis)  Comment: Related to multilevel lumbar spine stenosis with associated areas of significant central canal stenosis, foraminal stenosis, and facet arthropathy.  CT myelogram with changes relatively consistent with her symptoms.  Was evaluated with neurosurgery with no plan for surgical intervention, will undergo conservative pain control and consideration for injection therapy.  Additionally was seen by neurology with consideration for EMG and other evaluation, will follow-up as outpatient to further evaluate.  Plan:   -PT/OT  -Tylenol and ibuprofen as needed  -Follow-up with neurology as planned.  Consideration for B12 deficiency as etiology of her weakness, however seems more likely to be related to stenosis and will defer B12 lab at this  time.    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  She does sleep in a chair due to associated back pain when she lies down for prolonged period.  Nonsurgical interventions as above and is continuing to improve with current therapies.    (E43) Severe protein-calorie malnutrition (H)  Comment: Significantly low weight with evidence of decreased bulk and likely sarcopenia.  No symptoms of dysphagia.  Likely multifactorial.  Was receiving supplements in the hospital.  Will be seen by RD and to evaluate further.  Plan:  -CMP    (R63.4) Weight loss  Comment: Chronically losing weight over past 2 years.  Was 125 pounds at the end of 2020, 117 pounds June 2021, currently 114 pounds.  Related to malnutrition.    (M81.0) Age-related osteoporosis without current pathological fracture  Comment: Associated with wrist fracture approximately 1 year ago from placing weight on her arm. DEXA scan 5/2022 with significantly negative T-score -4.1 and significant bone loss since prior scan in 2007. Has been followed with orthopedic surgery and with chronic deformity of her wrist. Likely increased risk related to malnutrition  Plan:   -Continue alendronate 70mg weekly  -With fracture while on bisphosphonate, consider transition to anabolic agent    (I10) Essential (primary) hypertension  Comment: Blood pressures reasonably controlled with age and function.  Continue without change at this time.  Plan:   -Routine monitoring at facility    (Z95.0) Cadiac pacemaker in situ, dual chamber  (I44.1) Second degree AV block  Comment: Regular rate on exam, functioning appropriately.  Last seen by cardiology in June 2021.    (E87.6) Hypokalemia  Comment: Likely related to hydrochlorothiazide of Zestoretic.  She is on potassium supplement.  Was slightly  low at time of discharge.  Plan:   -CMP    Orders:  -CMP next lab day    67 MINUTES SPENT BY ME on the date of service doing chart review, history, exam, documentation & further activities per the note.      Electronically signed by:    Benjamin Rosenstein, MD, MA  [facility]    This note was completed with the assistance of dictation software. Typos and word substitution-errors are expected and unintended.

## 2023-01-18 LAB
ALBUMIN SERPL BCG-MCNC: 3.2 G/DL (ref 3.5–5.2)
ALP SERPL-CCNC: 52 U/L (ref 35–104)
ALT SERPL W P-5'-P-CCNC: 47 U/L (ref 10–35)
ANION GAP SERPL CALCULATED.3IONS-SCNC: 13 MMOL/L (ref 7–15)
AST SERPL W P-5'-P-CCNC: 49 U/L (ref 10–35)
BILIRUB SERPL-MCNC: 0.3 MG/DL
BUN SERPL-MCNC: 27.7 MG/DL (ref 8–23)
CALCIUM SERPL-MCNC: 9 MG/DL (ref 8.2–9.6)
CHLORIDE SERPL-SCNC: 106 MMOL/L (ref 98–107)
CREAT SERPL-MCNC: 0.84 MG/DL (ref 0.51–0.95)
DEPRECATED HCO3 PLAS-SCNC: 20 MMOL/L (ref 22–29)
GFR SERPL CREATININE-BSD FRML MDRD: 65 ML/MIN/1.73M2
GLUCOSE SERPL-MCNC: 124 MG/DL (ref 70–99)
POTASSIUM SERPL-SCNC: 3.9 MMOL/L (ref 3.4–5.3)
PROT SERPL-MCNC: 6 G/DL (ref 6.4–8.3)
SODIUM SERPL-SCNC: 139 MMOL/L (ref 136–145)

## 2023-01-18 PROCEDURE — 36415 COLL VENOUS BLD VENIPUNCTURE: CPT | Performed by: FAMILY MEDICINE

## 2023-01-18 PROCEDURE — 80053 COMPREHEN METABOLIC PANEL: CPT | Performed by: FAMILY MEDICINE

## 2023-01-18 PROCEDURE — P9604 ONE-WAY ALLOW PRORATED TRIP: HCPCS | Performed by: FAMILY MEDICINE

## 2023-01-19 ENCOUNTER — TRANSITIONAL CARE UNIT VISIT (OUTPATIENT)
Dept: GERIATRICS | Facility: CLINIC | Age: 88
End: 2023-01-19
Payer: MEDICARE

## 2023-01-19 VITALS
BODY MASS INDEX: 18.68 KG/M2 | OXYGEN SATURATION: 92 % | DIASTOLIC BLOOD PRESSURE: 73 MMHG | SYSTOLIC BLOOD PRESSURE: 137 MMHG | RESPIRATION RATE: 18 BRPM | WEIGHT: 109.4 LBS | TEMPERATURE: 98.3 F | HEIGHT: 64 IN | HEART RATE: 70 BPM

## 2023-01-19 DIAGNOSIS — M48.061 SPINAL STENOSIS OF LUMBAR REGION WITHOUT NEUROGENIC CLAUDICATION: ICD-10-CM

## 2023-01-19 DIAGNOSIS — E87.6 HYPOKALEMIA: ICD-10-CM

## 2023-01-19 DIAGNOSIS — R29.898 LEFT LEG WEAKNESS: Primary | ICD-10-CM

## 2023-01-19 DIAGNOSIS — Z95.0 CARDIAC PACEMAKER IN SITU: ICD-10-CM

## 2023-01-19 DIAGNOSIS — E43 SEVERE PROTEIN-CALORIE MALNUTRITION (H): ICD-10-CM

## 2023-01-19 DIAGNOSIS — R63.4 WEIGHT LOSS: ICD-10-CM

## 2023-01-19 DIAGNOSIS — B18.2 HEPATITIS C CARRIER (H): ICD-10-CM

## 2023-01-19 DIAGNOSIS — M47.16 LUMBAR SPONDYLOSIS WITH MYELOPATHY: ICD-10-CM

## 2023-01-19 DIAGNOSIS — I10 ESSENTIAL (PRIMARY) HYPERTENSION: ICD-10-CM

## 2023-01-19 DIAGNOSIS — M81.0 AGE-RELATED OSTEOPOROSIS WITHOUT CURRENT PATHOLOGICAL FRACTURE: ICD-10-CM

## 2023-01-19 PROCEDURE — 99309 SBSQ NF CARE MODERATE MDM 30: CPT | Performed by: NURSE PRACTITIONER

## 2023-01-19 NOTE — LETTER
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  She has been seen at [facility] the day prior and some tests were planned, including a CT and EMG.    At presentation, foot drop severity had not changed.  She described it as feeling heavy.  No tingling or loss of sensation.  No loss of bowel or bladder function.  Initial evaluation showed a normal metabolic profile, CBC with no leukocytosis but the appearance of chronic thrombocytopenia.  COVID-19 negative.  Troponin is negative.  She underwent CT head without acute findings other than chronic small vessel ischemic changes, changes in perivascular space, and generalized volume loss.    CT spine revealed multilevel spondylosis with severe lumbar levocurvature with associated severe canal stenosis at L2-3, L3-4, L4-5, as well as severe left L3-4 and right L1-2 foraminal stenosis.  Evaluated by neurosurgery; however, she did not wish to undergo any surgical procedures.  They did recommend CT myelogram which again demonstrated multilevel degeneration with areas of severe narrowing and other signs associated with severe  "spinal canal stenosis.      CURRENT/RECENT TCU ISSUES    Disposition  -- She tells me the story of what happened.  \"My leg is stopped.  I could not move it.\"  She developed some discomfort \"from sitting on my we are all the time.\"  Otherwise, no pain to speak of.  She is now wearing a brace on the left knee.  -- Pharmacy is recommending calcium with vitamin D (600/400).  She is already receiving Fosamax.  -- She has a job at [employer], though she can't describe exactly what she does.  -- She has been chronically losing weight over the past few years, and she doesn't know why.  She cooks her own meals in her own apartment but feels she \"kind of gave up on that.\"  While not yet in place, I suspect a nutritional supplement will be added.    ROS  -- 10 point ROS of systems including Constitutional, Eyes, Respiratory, Cardiovascular, Gastroenterology, Genitourinary, Integumentary, Muscularskeletal, Psychiatric were all negative except for pertinent positives noted in my HPI.      History reviewed. No pertinent past medical history.           History reviewed. No pertinent family history.  Social History     Socioeconomic History     Marital status: Single   Tobacco Use     Smoking status: Never     Smokeless tobacco: Never   Substance and Sexual Activity     Drug use: No       MEDICATIONS: Reviewed from the MAR, physician orders, and/or earlier progress notes.  MED REC REQUIRED  Post Medication Reconciliation Status: discharge medications reconciled and changed, per note/orders    Current Outpatient Medications   Medication Sig     acetaminophen (TYLENOL) 500 MG tablet Take 500 mg by mouth every 6 hours as needed for mild pain     alendronate (FOSAMAX) 70 MG tablet [ALENDRONATE (FOSAMAX) 70 MG TABLET] Take 70 mg by mouth once a week.     amLODIPine (NORVASC) 2.5 MG tablet [AMLODIPINE (NORVASC) 2.5 MG TABLET] Take 1 tablet daily     aspirin (ASA) 81 MG EC tablet Take 81 mg by mouth daily     atorvastatin " "(LIPITOR) 40 MG tablet Take 40 mg by mouth daily     camphor-menthol (DERMASARRA) 0.5-0.5 % external lotion Apply topically 4 times daily as needed for skin care     ibuprofen (ADVIL/MOTRIN) 200 MG tablet Take 200 mg by mouth every 8 hours as needed for pain     lisinopril-hydrochlorothiazide (PRINZIDE,ZESTORETIC) 20-25 mg per tablet Take 1 tablet by mouth daily     multivitamin therapeutic (THERAGRAN) tablet [MULTIVITAMIN THERAPEUTIC (THERAGRAN) TABLET] Take 1 tablet by mouth daily.     potassium chloride 20 mEq TbER [POTASSIUM CHLORIDE 20 MEQ TBER] Take 1 tablet by mouth daily.     No current facility-administered medications for this visit.     ALLERGIES: No Known Allergies    DIET: Regular, regular texture, thin liquids.    Vitals:    01/19/23 1014   BP: 137/73   Pulse: 70   Resp: 18   Temp: 98.3  F (36.8  C)   SpO2: 92%   Weight: 49.6 kg (109 lb 6.4 oz)   Height: 1.626 m (5' 4\")     Wt Readings from Last 4 Encounters:   01/19/23 49.6 kg (109 lb 6.4 oz)   01/16/23 51.8 kg (114 lb 3.2 oz)   06/16/21 53.1 kg (117 lb)   09/09/20 56.8 kg (125 lb 4.8 oz)     Body mass index is 18.78 kg/m .  Body surface area is 1.5 meters squared.    EXAMINATION:   General: Pleasant, alert, and conversant elderly female, in no apparent distress.  Head: Normocephalic and atraumatic.   Eyes: PERRLA, sclerae clear.   ENT: Moist oral mucosa.  She has her own teeth.  No nasal discharge.  Hearing is adequate with binaural hearing aids.  Cardiovascular: Regular rate and rhythm with a very short 2/6 DAVIN at the LSB.   Respiratory: Lungs clear to auscultation bilaterally.   Abdomen: Nondistended.   Musculoskeletal/Extremities: Age-related degenerative joint disease.  No peripheral edema.  Integument: No rashes, clinically significant lesions, or skin breakdown.   Cognitive/Psychiatric: Alert and oriented with euthymic affect.    DIAGNOSTICS:   Recent Results (from the past 240 hour(s))   Comprehensive metabolic panel    Collection Time: " 01/18/23 10:53 AM   Result Value Ref Range    Sodium 139 136 - 145 mmol/L    Potassium 3.9 3.4 - 5.3 mmol/L    Chloride 106 98 - 107 mmol/L    Carbon Dioxide (CO2) 20 (L) 22 - 29 mmol/L    Anion Gap 13 7 - 15 mmol/L    Urea Nitrogen 27.7 (H) 8.0 - 23.0 mg/dL    Creatinine 0.84 0.51 - 0.95 mg/dL    Calcium 9.0 8.2 - 9.6 mg/dL    Glucose 124 (H) 70 - 99 mg/dL    Alkaline Phosphatase 52 35 - 104 U/L    AST 49 (H) 10 - 35 U/L    ALT 47 (H) 10 - 35 U/L    Protein Total 6.0 (L) 6.4 - 8.3 g/dL    Albumin 3.2 (L) 3.5 - 5.2 g/dL    Bilirubin Total 0.3 <=1.2 mg/dL    GFR Estimate 65 >60 mL/min/1.73m2       ASSESSMENT/Plan:      ICD-10-CM    1. Left leg weakness  R29.898       2. Lumbar spondylosis with myelopathy  M47.16       3. Spinal stenosis of lumbar region without neurogenic claudication  M48.061       4. Weight loss  R63.4       5. Severe protein-calorie malnutrition (H)  E43       6. Age-related osteoporosis without current pathological fracture  M81.0       7. Essential (primary) hypertension  I10       8. Cardiac pacemaker in situ, dual chamber  Z95.0       9. Hypokalemia  E87.6       10. Hepatitis C carrier (H)  B18.2           CHANGES:    Calcium with vitamin D 600/400.  1 tablet daily.    CARE PLAN:    The care plan, medications, vital signs, orders, and nursing notes have been reviewed, and all orders signed. Changes to care plan, if any, as noted. Otherwise, continue current plan of care.  Total time spent in this encounter was 39 minutes with most of the time spent in review of her medical records as well as clarification from the patient herself.    The above has been created using voice recognition software. Please be aware that this may unintentionally  produce inaccuracies and/or nonsensical sentences.      Electronically signed by: ANN Nguyen CNP            Sincerely,        ANN Nguyen CNP

## 2023-01-21 NOTE — PROGRESS NOTES
[facility]iatric[facility]    Name:   Siobhan Dawson  :   10/23/1931  MRN:    [id number]     Facility:   [facility] (SNF) [[id number]]   Room: TCU / [place] [id number]  Code Status: DNR/DNI and POLST AVAILABLE -     DOS:  2023    PCP:  Jessika Hall MD     CHIEF COMPLAINT / REASON FOR VISIT:  Chief Complaint   Patient presents with     RECHECK     pmhx including osteoporosis (on alendronate), HTN, 2nd deg AV block s/p PPM ()         [facility]     HPI: Siobhan is a 89 year old female with a past medical history significant for essential hypertension, 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  She has been seen at [facility] the day prior and some tests were planned, including a CT and EMG.    At presentation, foot drop severity had not changed.  She described it as feeling heavy.  No tingling or loss of sensation.  No loss of bowel or bladder function.  Initial evaluation showed a normal metabolic profile, CBC with no leukocytosis but the appearance of chronic thrombocytopenia.  COVID-19 negative.  Troponin is negative.  She underwent CT head without acute findings other than chronic small vessel ischemic changes, changes in perivascular space, and generalized volume loss.    CT spine revealed multilevel spondylosis with severe lumbar levocurvature with associated severe canal stenosis at L2-3, L3-4, L4-5, as well as severe left L3-4 and right L1-2 foraminal stenosis.  Evaluated by neurosurgery; however, she did not wish to undergo any surgical procedures.  They did recommend CT myelogram which again demonstrated multilevel degeneration with areas of severe narrowing and other signs associated with severe spinal canal stenosis.      CURRENT/RECENT TCU ISSUES    Disposition  -- She tells me the story  "of what happened.  \"My leg is stopped.  I could not move it.\"  She developed some discomfort \"from sitting on my we are all the time.\"  Otherwise, no pain to speak of.  She is now wearing a brace on the left knee.  -- Pharmacy is recommending calcium with vitamin D (600/400).  She is already receiving Fosamax.  -- She has a job at [employer], though she can't describe exactly what she does.  -- She has been chronically losing weight over the past few years, and she doesn't know why.  She cooks her own meals in her own apartment but feels she \"kind of gave up on that.\"  While not yet in place, I suspect a nutritional supplement will be added.    ROS  -- 10 point ROS of systems including Constitutional, Eyes, Respiratory, Cardiovascular, Gastroenterology, Genitourinary, Integumentary, Muscularskeletal, Psychiatric were all negative except for pertinent positives noted in my HPI.      History reviewed. No pertinent past medical history.           History reviewed. No pertinent family history.  Social History     Socioeconomic History     Marital status: Single   Tobacco Use     Smoking status: Never     Smokeless tobacco: Never   Substance and Sexual Activity     Drug use: No       MEDICATIONS: Reviewed from the MAR, physician orders, and/or earlier progress notes.  MED REC REQUIRED  Post Medication Reconciliation Status: discharge medications reconciled and changed, per note/orders    Current Outpatient Medications   Medication Sig     acetaminophen (TYLENOL) 500 MG tablet Take 500 mg by mouth every 6 hours as needed for mild pain     alendronate (FOSAMAX) 70 MG tablet [ALENDRONATE (FOSAMAX) 70 MG TABLET] Take 70 mg by mouth once a week.     amLODIPine (NORVASC) 2.5 MG tablet [AMLODIPINE (NORVASC) 2.5 MG TABLET] Take 1 tablet daily     aspirin (ASA) 81 MG EC tablet Take 81 mg by mouth daily     atorvastatin (LIPITOR) 40 MG tablet Take 40 mg by mouth daily     camphor-menthol (DERMASARRA) 0.5-0.5 % external " "lotion Apply topically 4 times daily as needed for skin care     ibuprofen (ADVIL/MOTRIN) 200 MG tablet Take 200 mg by mouth every 8 hours as needed for pain     lisinopril-hydrochlorothiazide (PRINZIDE,ZESTORETIC) 20-25 mg per tablet Take 1 tablet by mouth daily     multivitamin therapeutic (THERAGRAN) tablet [MULTIVITAMIN THERAPEUTIC (THERAGRAN) TABLET] Take 1 tablet by mouth daily.     potassium chloride 20 mEq TbER [POTASSIUM CHLORIDE 20 MEQ TBER] Take 1 tablet by mouth daily.     No current facility-administered medications for this visit.     ALLERGIES: No Known Allergies    DIET: Regular, regular texture, thin liquids.    Vitals:    01/19/23 1014   BP: 137/73   Pulse: 70   Resp: 18   Temp: 98.3  F (36.8  C)   SpO2: 92%   Weight: 49.6 kg (109 lb 6.4 oz)   Height: 1.626 m (5' 4\")     Wt Readings from Last 4 Encounters:   01/19/23 49.6 kg (109 lb 6.4 oz)   01/16/23 51.8 kg (114 lb 3.2 oz)   06/16/21 53.1 kg (117 lb)   09/09/20 56.8 kg (125 lb 4.8 oz)     Body mass index is 18.78 kg/m .  Body surface area is 1.5 meters squared.    EXAMINATION:   General: Pleasant, alert, and conversant elderly female, in no apparent distress.  Head: Normocephalic and atraumatic.   Eyes: PERRLA, sclerae clear.   ENT: Moist oral mucosa.  She has her own teeth.  No nasal discharge.  Hearing is adequate with binaural hearing aids.  Cardiovascular: Regular rate and rhythm with a very short 2/6 DAVIN at the LSB.   Respiratory: Lungs clear to auscultation bilaterally.   Abdomen: Nondistended.   Musculoskeletal/Extremities: Age-related degenerative joint disease.  No peripheral edema.  Integument: No rashes, clinically significant lesions, or skin breakdown.   Cognitive/Psychiatric: Alert and oriented with euthymic affect.    DIAGNOSTICS:   Recent Results (from the past 240 hour(s))   Comprehensive metabolic panel    Collection Time: 01/18/23 10:53 AM   Result Value Ref Range    Sodium 139 136 - 145 mmol/L    Potassium 3.9 3.4 - 5.3 mmol/L "    Chloride 106 98 - 107 mmol/L    Carbon Dioxide (CO2) 20 (L) 22 - 29 mmol/L    Anion Gap 13 7 - 15 mmol/L    Urea Nitrogen 27.7 (H) 8.0 - 23.0 mg/dL    Creatinine 0.84 0.51 - 0.95 mg/dL    Calcium 9.0 8.2 - 9.6 mg/dL    Glucose 124 (H) 70 - 99 mg/dL    Alkaline Phosphatase 52 35 - 104 U/L    AST 49 (H) 10 - 35 U/L    ALT 47 (H) 10 - 35 U/L    Protein Total 6.0 (L) 6.4 - 8.3 g/dL    Albumin 3.2 (L) 3.5 - 5.2 g/dL    Bilirubin Total 0.3 <=1.2 mg/dL    GFR Estimate 65 >60 mL/min/1.73m2       ASSESSMENT/Plan:      ICD-10-CM    1. Left leg weakness  R29.898       2. Lumbar spondylosis with myelopathy  M47.16       3. Spinal stenosis of lumbar region without neurogenic claudication  M48.061       4. Weight loss  R63.4       5. Severe protein-calorie malnutrition (H)  E43       6. Age-related osteoporosis without current pathological fracture  M81.0       7. Essential (primary) hypertension  I10       8. Cardiac pacemaker in situ, dual chamber  Z95.0       9. Hypokalemia  E87.6       10. Hepatitis C carrier (H)  B18.2           CHANGES:    Calcium with vitamin D 600/400.  1 tablet daily.    CARE PLAN:    The care plan, medications, vital signs, orders, and nursing notes have been reviewed, and all orders signed. Changes to care plan, if any, as noted. Otherwise, continue current plan of care.  Total time spent in this encounter was 39 minutes with most of the time spent in review of her medical records as well as clarification from the patient herself.    The above has been created using voice recognition software. Please be aware that this may unintentionally  produce inaccuracies and/or nonsensical sentences.      Electronically signed by: ANN Nguyen CNP

## 2023-01-23 ENCOUNTER — TRANSITIONAL CARE UNIT VISIT (OUTPATIENT)
Dept: GERIATRICS | Facility: CLINIC | Age: 88
End: 2023-01-23
Payer: MEDICARE

## 2023-01-23 VITALS
OXYGEN SATURATION: 96 % | BODY MASS INDEX: 18.68 KG/M2 | DIASTOLIC BLOOD PRESSURE: 66 MMHG | TEMPERATURE: 97.8 F | RESPIRATION RATE: 19 BRPM | HEART RATE: 87 BPM | HEIGHT: 64 IN | SYSTOLIC BLOOD PRESSURE: 103 MMHG | WEIGHT: 109.4 LBS

## 2023-01-23 DIAGNOSIS — R74.8 ELEVATED LIVER ENZYMES: Primary | ICD-10-CM

## 2023-01-23 DIAGNOSIS — R29.898 LEFT LEG WEAKNESS: ICD-10-CM

## 2023-01-23 DIAGNOSIS — B18.2 HEPATITIS C CARRIER (H): ICD-10-CM

## 2023-01-23 DIAGNOSIS — E87.6 HYPOKALEMIA: ICD-10-CM

## 2023-01-23 DIAGNOSIS — E43 SEVERE PROTEIN-CALORIE MALNUTRITION (H): ICD-10-CM

## 2023-01-23 DIAGNOSIS — M81.0 AGE-RELATED OSTEOPOROSIS WITHOUT CURRENT PATHOLOGICAL FRACTURE: ICD-10-CM

## 2023-01-23 DIAGNOSIS — M47.16 LUMBAR SPONDYLOSIS WITH MYELOPATHY: ICD-10-CM

## 2023-01-23 PROCEDURE — 99309 SBSQ NF CARE MODERATE MDM 30: CPT | Performed by: FAMILY MEDICINE

## 2023-01-23 NOTE — PROGRESS NOTES
"[facility] GERIATRICS    Chief Complaint   Patient presents with     RECHECK     HPI:  Siobhan Dawson is a 89 year old, Sister of gee Tillman (10/23/1931), with pmhx including osteoporosis (on alendronate), HTN, 2nd deg AV block s/p PPM () admitted due to LLE weakness with myelopathy who is being seen today for an episodic care visit at: [facility] ([facility]) [[id number]].     Seen today for routine follow-up.  Overall she is doing as well as she can.  She has been walking more with therapy.  Feels like her left leg is moving better.  She is not having much pain.  She does note some aches \"in my rear\" when seated for prolonged period.  However this is improving.  She continues to wear her AFO with good effect.    Says her appetite is okay and she is eating as well as she can.  She does have to try to eat more at times.  She was seen by RD and started a mighty shake with meals daily as well as Magic cup daily.  She has no nausea or vomiting.  No abdominal pain.  No diarrhea or constipation.     She was visiting with one of her sisters earlier and this went well.  She is looking forward to being able to return.  In review with therapy, she is making slow but steady progress.  Unclear discharge date at this time.    Additionally, did note on prior lab work, that she had elevated liver enzymes.  She has no right upper quadrant pain.  No shortness of breath.  No lower extremity swelling.  On review, she was started on atorvastatin after her discharge due to concerns for left lower extremity weakness being related to CVA.  However, given other findings, seems much more likely related to myelopathy.  Discussed with her limited indication for statin in her case and she is comfortable discontinuing this.  She says she has never had a heart attack or stroke and as such, reviewed that she also may discontinue her aspirin given limited indication she wishes to do this as well.    Objective:   /66   Pulse " "87   Temp 97.8  F (36.6  C)   Resp 19   Ht 1.626 m (5' 4\")   Wt 49.6 kg (109 lb 6.4 oz)   SpO2 96%   BMI 18.78 kg/m      GENERAL APPEARANCE: Seated comfortably, fatigued from therapy, NAD  PULM  Normal WOB on RA, lungs CTAB, no wheezes or crackles, good air movement  CV:  RRR, S1/S2 normal, no murmurs; no LE edema  ABDOMEN: Abdomen soft, not tender, not distended, BS normal and active throughout   M/S:   Generalized atrophy; LLE with AFO; right wrist with prior noted ulnar prominene and dorsal angulation  NEURO: Alert, answering questions appropriately, normal thought processes; CN II-XII grossly intact, LLE strength improved with now 4/5 in hip flexion and knee extension  PSYCH:  Mood normal with congruent affect    Recent labs in EPIC reviewed by me today.     Assessment/Plan:  (R74.8) Elevated liver enzymes  (primary encounter diagnosis)  Comment: Possibly related to statin given prior normal liver enzymes prior to hospital discharge.  As noted, no history of CVA or MI.  Limited indication for primary statin in this age group.  Plan:   -Discontinue atorvastatin  -Repeat CMP 1 week    (B18.2) Hepatitis C carrier (H)  Comment: History of this, unlikely related to elevated liver enzymes.    (R24.468) Left leg weakness  Comment: Related to multilevel lumbar spine stenosis with associated areas of significant central canal stenosis, foraminal stenosis, facet arthropathy.  CT myelogram which changes consistent with her symptoms.  She was seen by neurosurgery with no plans for surgical intervention.  Strength and pain have been improving with therapies.  Plan:   -PT/OT  -Continue Tylenol and ibuprofen as needed  -Follow-up with neurology for possible EMG and other evaluation    (M47.16) Lumbar spondylosis with myelopathy  Comment: Chronic history of this with acute worsening with associated left foot drop and difficulty with ambulation.  Historically has slept in a chair due to back pain and laying for long " periods.  Continue to improve with therapies.  Plan:   -PT/OT  -Regular use of AFO    (E43) Severe protein-calorie malnutrition (H)  Comment: Significantly low weight with evidence of decreased bulk and sarcopenia.  She does not have any symptoms of dysphagia.  Likely multifactorial.  Started on mighty shake and Magic cup daily per RD.    (M81.0) Age-related osteoporosis without current pathological fracture  Comment: Associated with wrist fracture approximately 1 year ago from placing weight on her arm. DEXA scan 5/2022 with significantly negative T-score -4.1 and significant bone loss since prior scan in 2007. Has been followed with orthopedic surgery and with chronic deformity of her wrist. Likely increased risk related to malnutrition   Plan:  -Continue alendronate weekly  -Consider anabolic agent with fracture while on bisphosphonate     (E87.6) Hypokalemia  Comment: Resolved    MED REC REQUIRED  Post Medication Reconciliation Status: patient was not discharged from an inpatient facility or TCU      Orders:  [x] Discontinue atorvastatin and apirin  [x] Repeat CMP next week    36 MINUTES SPENT BY ME on the date of service doing chart review, history, exam, documentation & further activities per the note.        Electronically signed by:     Benjamin Rosenstein, MD, MA  [facility]    This note was completed with the assistance of dictation software. Typos and word substitution-errors are expected and unintended.

## 2023-01-26 ENCOUNTER — LAB REQUISITION (OUTPATIENT)
Dept: LAB | Facility: CLINIC | Age: 88
End: 2023-01-26
Payer: MEDICARE

## 2023-01-26 DIAGNOSIS — R74.9 ABNORMAL SERUM ENZYME LEVEL, UNSPECIFIED: ICD-10-CM

## 2023-01-30 LAB
ALBUMIN SERPL BCG-MCNC: 3.4 G/DL (ref 3.5–5.2)
ALP SERPL-CCNC: 51 U/L (ref 35–104)
ALT SERPL W P-5'-P-CCNC: 19 U/L (ref 10–35)
ANION GAP SERPL CALCULATED.3IONS-SCNC: 15 MMOL/L (ref 7–15)
AST SERPL W P-5'-P-CCNC: 34 U/L (ref 10–35)
BILIRUB SERPL-MCNC: 0.3 MG/DL
BUN SERPL-MCNC: 24.5 MG/DL (ref 8–23)
CALCIUM SERPL-MCNC: 9.3 MG/DL (ref 8.2–9.6)
CHLORIDE SERPL-SCNC: 105 MMOL/L (ref 98–107)
CREAT SERPL-MCNC: 0.87 MG/DL (ref 0.51–0.95)
DEPRECATED HCO3 PLAS-SCNC: 22 MMOL/L (ref 22–29)
GFR SERPL CREATININE-BSD FRML MDRD: 63 ML/MIN/1.73M2
GLUCOSE SERPL-MCNC: 124 MG/DL (ref 70–99)
POTASSIUM SERPL-SCNC: 4.2 MMOL/L (ref 3.4–5.3)
PROT SERPL-MCNC: 5.7 G/DL (ref 6.4–8.3)
SODIUM SERPL-SCNC: 142 MMOL/L (ref 136–145)

## 2023-01-30 PROCEDURE — P9604 ONE-WAY ALLOW PRORATED TRIP: HCPCS | Performed by: FAMILY MEDICINE

## 2023-01-30 PROCEDURE — 80053 COMPREHEN METABOLIC PANEL: CPT | Performed by: FAMILY MEDICINE

## 2023-01-30 PROCEDURE — 36415 COLL VENOUS BLD VENIPUNCTURE: CPT | Performed by: FAMILY MEDICINE

## 2023-01-31 ENCOUNTER — TELEPHONE (OUTPATIENT)
Dept: GERIATRICS | Facility: CLINIC | Age: 88
End: 2023-01-31
Payer: MEDICARE

## 2023-01-31 NOTE — TELEPHONE ENCOUNTER
[facility] Triage Nurse Telephone Encounter    Provider: NADIR Ralph  Facility: [facility]  Facility Type:  TCU    Caller: Yeny   Call Back Number: [phone]    Allergies:  No Known Allergies     Reason for call: Pt has new left foot swelling, No recent trauma noted, no bruising, no redness, no warmth, no pain at this time. Pt has a wt increased to 117.8lbs up from last week 113.8. No cough or SOB noted. VS stable.     Verbal Order/Direction given by Provider: Encourage elevation, continue to monitor for worsening or any new sympoms.     Provider giving Order:  NADIR Ralph    Verbal Order given to: Yeny Simpson RN

## 2023-02-02 ENCOUNTER — TRANSITIONAL CARE UNIT VISIT (OUTPATIENT)
Dept: GERIATRICS | Facility: CLINIC | Age: 88
End: 2023-02-02
Payer: MEDICARE

## 2023-02-02 VITALS
HEART RATE: 46 BPM | WEIGHT: 109.4 LBS | DIASTOLIC BLOOD PRESSURE: 62 MMHG | BODY MASS INDEX: 18.68 KG/M2 | TEMPERATURE: 98.4 F | SYSTOLIC BLOOD PRESSURE: 108 MMHG | OXYGEN SATURATION: 99 % | RESPIRATION RATE: 18 BRPM | HEIGHT: 64 IN

## 2023-02-02 DIAGNOSIS — E87.6 HYPOKALEMIA: ICD-10-CM

## 2023-02-02 DIAGNOSIS — I10 ESSENTIAL (PRIMARY) HYPERTENSION: ICD-10-CM

## 2023-02-02 DIAGNOSIS — Z95.0 CARDIAC PACEMAKER IN SITU: ICD-10-CM

## 2023-02-02 DIAGNOSIS — B18.2 HEPATITIS C CARRIER (H): ICD-10-CM

## 2023-02-02 DIAGNOSIS — M81.0 AGE-RELATED OSTEOPOROSIS WITHOUT CURRENT PATHOLOGICAL FRACTURE: ICD-10-CM

## 2023-02-02 DIAGNOSIS — M47.16 LUMBAR SPONDYLOSIS WITH MYELOPATHY: ICD-10-CM

## 2023-02-02 DIAGNOSIS — E43 SEVERE PROTEIN-CALORIE MALNUTRITION (H): ICD-10-CM

## 2023-02-02 DIAGNOSIS — M48.061 SPINAL STENOSIS OF LUMBAR REGION WITHOUT NEUROGENIC CLAUDICATION: ICD-10-CM

## 2023-02-02 DIAGNOSIS — R29.898 LEFT LEG WEAKNESS: Primary | ICD-10-CM

## 2023-02-02 DIAGNOSIS — R63.4 WEIGHT LOSS: ICD-10-CM

## 2023-02-02 PROCEDURE — 99309 SBSQ NF CARE MODERATE MDM 30: CPT | Performed by: NURSE PRACTITIONER

## 2023-02-02 NOTE — LETTER
2023        RE: Siobhan Dawson  [address]  [place] [address]        [facility]iatric[facility]    Name:   Siobhan Dawson  :   10/23/1931  MRN:    [id number]     Facility:   [facility] (SNF) [[id number]]   Room: TCU / [place] [address]  Code Status: DNR/DNI and POLST AVAILABLE -     DOS:  2023    PCP:  Jessika Hall MD     CHIEF COMPLAINT / REASON FOR VISIT:  Chief Complaint   Patient presents with     Clinic Care Coordination - Follow-up     Left leg weakness and foot drop        [facility] from 2023 until 2023 (Left leg weakness and foot drop)  [facility] TCU from 2023 until 2023 (approximate discharge date, TBD)      HPI: Siobhan is a 89 year old female with a past medical history significant for essential hypertension, 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  She has been seen at [facility] the day prior and some tests were planned, including a CT and EMG.    At presentation, foot drop severity hadn't changed.  She described it as feeling heavy.  No tingling or loss of sensation.  No loss of bowel or bladder function.  Initial evaluation showed a normal metabolic profile, CBC with no leukocytosis but the appearance of chronic thrombocytopenia.  COVID-19 negative.  Troponin negative.  She underwent CT head without acute findings other than chronic small vessel ischemic changes, changes in perivascular space, and generalized volume loss.    CT spine revealed multilevel spondylosis with severe lumbar levocurvature with associated severe canal stenosis at L2-3, L3-4, L4-5, as well as severe left L3-4 and right L1-2 foraminal stenosis.  Evaluated by neurosurgery; however, she did not wish to undergo any surgical procedures due to her  "advanced age.  They did recommend CT myelogram which again demonstrated multilevel degeneration with areas of severe narrowing and other signs associated with severe spinal canal stenosis.      CURRENT/RECENT TCU ISSUES    Disposition  -- Extremely pleasant.  Appreciates working with therapies.  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    Spinal stenosis  Left leg weakness  Lumbar spondylosis with myelopathy  -- Some discomfort in the low back, especially when getting into bed or turning.  -- She did tell me the story of what happened and why she's here.  \"My foot just stopped, and I couldn't move it. That's what I'm working on [here].\"   She is now wearing a brace on the left knee.    Age-related osteoporosis  -- Pharmacy recommended calcium with vitamin D (600/400).  She is already receiving Fosamax.    Essential hypertension  -- Blood pressures look good on amlodipine and lisinopril-hydrochlorothiazide.  A low pulse of 46 was noted; however, this cannot be confirmed, and the exact time is uncertain.  She is only on 2.5 mg of amlodipine.  Continue to assess.    Severe protein calorie malnutrition  Weight loss  -- She has been chronically losing weight over the past few years, and she doesn't know why.  She cooks her own meals in her own apartment but feels she \"kind of gave up on that.\"    -- Receiving nutritional supplement.    Discharge planning  -- Anticipated discharge sometime next week.    ROS  -- 10 point ROS of systems including Constitutional, Eyes, Respiratory, Cardiovascular, Gastroenterology, Genitourinary, Integumentary, Muscularskeletal, Psychiatric were all negative except for pertinent positives noted in my HPI.      History reviewed. No pertinent past medical history.           History reviewed. No pertinent family history.  Social History     Socioeconomic History     Marital status: Single   Tobacco Use     Smoking status: Never " "    Smokeless tobacco: Never   Substance and Sexual Activity     Drug use: No       MEDICATIONS: Reviewed from the MAR, physician orders, and/or earlier progress notes.  MED REC REQUIRED  Post Medication Reconciliation Status: medication reconcilation previously completed during another office visit    Current Outpatient Medications   Medication Sig     acetaminophen (TYLENOL) 500 MG tablet Take 500 mg by mouth every 6 hours as needed for mild pain     alendronate (FOSAMAX) 70 MG tablet [ALENDRONATE (FOSAMAX) 70 MG TABLET] Take 70 mg by mouth once a week.     amLODIPine (NORVASC) 2.5 MG tablet [AMLODIPINE (NORVASC) 2.5 MG TABLET] Take 1 tablet daily     aspirin (ASA) 81 MG EC tablet Take 81 mg by mouth daily     atorvastatin (LIPITOR) 40 MG tablet Take 40 mg by mouth daily     camphor-menthol (DERMASARRA) 0.5-0.5 % external lotion Apply topically 4 times daily as needed for skin care     ibuprofen (ADVIL/MOTRIN) 200 MG tablet Take 200 mg by mouth every 8 hours as needed for pain     lisinopril-hydrochlorothiazide (PRINZIDE,ZESTORETIC) 20-25 mg per tablet Take 1 tablet by mouth daily     multivitamin therapeutic (THERAGRAN) tablet [MULTIVITAMIN THERAPEUTIC (THERAGRAN) TABLET] Take 1 tablet by mouth daily.     potassium chloride 20 mEq TbER [POTASSIUM CHLORIDE 20 MEQ TBER] Take 1 tablet by mouth daily.     No current facility-administered medications for this visit.     ALLERGIES: No Known Allergies    DIET: Regular, regular texture, thin liquids.  Additional nutritional supplementation.    Vitals:    02/02/23 1306   BP: 108/62   Pulse: (!) 46   Resp: 18   Temp: 98.4  F (36.9  C)   SpO2: 99%   Weight: 49.6 kg (109 lb 6.4 oz)   Height: 1.626 m (5' 4\")     Wt Readings from Last 4 Encounters:   02/02/23 49.6 kg (109 lb 6.4 oz)   01/23/23 49.6 kg (109 lb 6.4 oz)   01/19/23 49.6 kg (109 lb 6.4 oz)   01/16/23 51.8 kg (114 lb 3.2 oz)     Body mass index is 18.78 kg/m .  Body surface area is 1.5 meters " squared.    EXAMINATION:   General: Extremely pleasant, alert, and conversant elderly female, up in a recliner, in no apparent distress.  Head: Normocephalic and atraumatic.   Eyes: PERRLA, sclerae clear.   ENT: Moist oral mucosa.  She has her own teeth.  No nasal discharge.  Hearing adequate with binaural hearing aids.  Cardiovascular: RRR with very short 2/6 DAVIN at the LSB.   Respiratory: Lungs CTAB.   Abdomen: Nondistended.   Musculoskeletal/Extremities: Age-related arthritic changes.  No peripheral edema.  Integument: No rashes, clinically significant lesions, or skin breakdown.   Cognitive/Psychiatric: Alert and oriented with delightfully pleasant affect.    DIAGNOSTICS:   Recent Results (from the past 240 hour(s))   Comprehensive metabolic panel    Collection Time: 01/30/23 10:21 AM   Result Value Ref Range    Sodium 142 136 - 145 mmol/L    Potassium 4.2 3.4 - 5.3 mmol/L    Chloride 105 98 - 107 mmol/L    Carbon Dioxide (CO2) 22 22 - 29 mmol/L    Anion Gap 15 7 - 15 mmol/L    Urea Nitrogen 24.5 (H) 8.0 - 23.0 mg/dL    Creatinine 0.87 0.51 - 0.95 mg/dL    Calcium 9.3 8.2 - 9.6 mg/dL    Glucose 124 (H) 70 - 99 mg/dL    Alkaline Phosphatase 51 35 - 104 U/L    AST 34 10 - 35 U/L    ALT 19 10 - 35 U/L    Protein Total 5.7 (L) 6.4 - 8.3 g/dL    Albumin 3.4 (L) 3.5 - 5.2 g/dL    Bilirubin Total 0.3 <=1.2 mg/dL    GFR Estimate 63 >60 mL/min/1.73m2       ASSESSMENT/Plan:      ICD-10-CM    1. Left leg weakness  R29.898       2. Lumbar spondylosis with myelopathy  M47.16       3. Spinal stenosis of lumbar region without neurogenic claudication  M48.061       4. Severe protein-calorie malnutrition (H)  E43       5. Weight loss  R63.4       6. Age-related osteoporosis without current pathological fracture  M81.0       7. Essential (primary) hypertension  I10       8. Cardiac pacemaker in situ, dual chamber (2011)  Z95.0       9. Hypokalemia  E87.6       10. Hepatitis C carrier (H)  B18.2           CHANGES:    Pay close  attention to blood pressures.  May need to make some changes.    CARE PLAN:    The care plan, medications, vital signs, orders, and nursing notes have been reviewed, and all orders signed. Changes to care plan, if any, as noted. Otherwise, continue current plan of care.  Total time spent in this encounter was 36 minutes with most of the time spent addressing current issues and some discussion regarding upcoming discharge.    The above has been created using voice recognition software. Please be aware that this may unintentionally  produce inaccuracies and/or nonsensical sentences.      Electronically signed by: ANN Nguyen CNP        Sincerely,        ANN Nguyen CNP

## 2023-02-05 NOTE — PROGRESS NOTES
[facility]    Name:   Siobhan Dawson  :   10/23/1931  MRN:    [id number]     Facility:   [facility] (SNF) [[id number]]   Room: TCU / [place] [address]  Code Status: DNR/DNI and POLST AVAILABLE -     DOS:  2023    PCP:  Jessika Hall MD     CHIEF COMPLAINT / REASON FOR VISIT:  Chief Complaint   Patient presents with     Clinic Care Coordination - Follow-up     Left leg weakness and foot drop        [facility] from 2023 until 2023 (Left leg weakness and foot drop)  [facility] TCU from 2023 until 2023 (approximate discharge date, TBD)      HPI: Siobhan is a 89 year old female with a past medical history significant for essential hypertension, 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  She has been seen at [facility] the day prior and some tests were planned, including a CT and EMG.    At presentation, foot drop severity hadn't changed.  She described it as feeling heavy.  No tingling or loss of sensation.  No loss of bowel or bladder function.  Initial evaluation showed a normal metabolic profile, CBC with no leukocytosis but the appearance of chronic thrombocytopenia.  COVID-19 negative.  Troponin negative.  She underwent CT head without acute findings other than chronic small vessel ischemic changes, changes in perivascular space, and generalized volume loss.    CT spine revealed multilevel spondylosis with severe lumbar levocurvature with associated severe canal stenosis at L2-3, L3-4, L4-5, as well as severe left L3-4 and right L1-2 foraminal stenosis.  Evaluated by neurosurgery; however, she did not wish to undergo any surgical procedures due to her advanced age.  They did recommend CT myelogram which again demonstrated multilevel degeneration  "with areas of severe narrowing and other signs associated with severe spinal canal stenosis.      CURRENT/RECENT TCU ISSUES    Disposition  -- Extremely pleasant.  Appreciates working with therapies.  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    Spinal stenosis  Left leg weakness  Lumbar spondylosis with myelopathy  -- Some discomfort in the low back, especially when getting into bed or turning.  -- She did tell me the story of what happened and why she's here.  \"My foot just stopped, and I couldn't move it. That's what I'm working on [here].\"   She is now wearing a brace on the left knee.    Age-related osteoporosis  -- Pharmacy recommended calcium with vitamin D (600/400).  She is already receiving Fosamax.    Essential hypertension  -- Blood pressures look good on amlodipine and lisinopril-hydrochlorothiazide.  A low pulse of 46 was noted; however, this cannot be confirmed, and the exact time is uncertain.  She is only on 2.5 mg of amlodipine.  Continue to assess.    Severe protein calorie malnutrition  Weight loss  -- She has been chronically losing weight over the past few years, and she doesn't know why.  She cooks her own meals in her own apartment but feels she \"kind of gave up on that.\"    -- Receiving nutritional supplement.    Discharge planning  -- Anticipated discharge sometime next week.    ROS  -- 10 point ROS of systems including Constitutional, Eyes, Respiratory, Cardiovascular, Gastroenterology, Genitourinary, Integumentary, Muscularskeletal, Psychiatric were all negative except for pertinent positives noted in my HPI.      History reviewed. No pertinent past medical history.           History reviewed. No pertinent family history.  Social History     Socioeconomic History     Marital status: Single   Tobacco Use     Smoking status: Never     Smokeless tobacco: Never   Substance and Sexual Activity     Drug use: No       MEDICATIONS: " "Reviewed from the MAR, physician orders, and/or earlier progress notes.  MED REC REQUIRED  Post Medication Reconciliation Status: medication reconcilation previously completed during another office visit    Current Outpatient Medications   Medication Sig     acetaminophen (TYLENOL) 500 MG tablet Take 500 mg by mouth every 6 hours as needed for mild pain     alendronate (FOSAMAX) 70 MG tablet [ALENDRONATE (FOSAMAX) 70 MG TABLET] Take 70 mg by mouth once a week.     amLODIPine (NORVASC) 2.5 MG tablet [AMLODIPINE (NORVASC) 2.5 MG TABLET] Take 1 tablet daily     aspirin (ASA) 81 MG EC tablet Take 81 mg by mouth daily     atorvastatin (LIPITOR) 40 MG tablet Take 40 mg by mouth daily     camphor-menthol (DERMASARRA) 0.5-0.5 % external lotion Apply topically 4 times daily as needed for skin care     ibuprofen (ADVIL/MOTRIN) 200 MG tablet Take 200 mg by mouth every 8 hours as needed for pain     lisinopril-hydrochlorothiazide (PRINZIDE,ZESTORETIC) 20-25 mg per tablet Take 1 tablet by mouth daily     multivitamin therapeutic (THERAGRAN) tablet [MULTIVITAMIN THERAPEUTIC (THERAGRAN) TABLET] Take 1 tablet by mouth daily.     potassium chloride 20 mEq TbER [POTASSIUM CHLORIDE 20 MEQ TBER] Take 1 tablet by mouth daily.     No current facility-administered medications for this visit.     ALLERGIES: No Known Allergies    DIET: Regular, regular texture, thin liquids.  Additional nutritional supplementation.    Vitals:    02/02/23 1306   BP: 108/62   Pulse: (!) 46   Resp: 18   Temp: 98.4  F (36.9  C)   SpO2: 99%   Weight: 49.6 kg (109 lb 6.4 oz)   Height: 1.626 m (5' 4\")     Wt Readings from Last 4 Encounters:   02/02/23 49.6 kg (109 lb 6.4 oz)   01/23/23 49.6 kg (109 lb 6.4 oz)   01/19/23 49.6 kg (109 lb 6.4 oz)   01/16/23 51.8 kg (114 lb 3.2 oz)     Body mass index is 18.78 kg/m .  Body surface area is 1.5 meters squared.    EXAMINATION:   General: Extremely pleasant, alert, and conversant elderly female, up in a recliner, in no " apparent distress.  Head: Normocephalic and atraumatic.   Eyes: PERRLA, sclerae clear.   ENT: Moist oral mucosa.  She has her own teeth.  No nasal discharge.  Hearing adequate with binaural hearing aids.  Cardiovascular: RRR with very short 2/6 DAVIN at the LSB.   Respiratory: Lungs CTAB.   Abdomen: Nondistended.   Musculoskeletal/Extremities: Age-related arthritic changes.  No peripheral edema.  Integument: No rashes, clinically significant lesions, or skin breakdown.   Cognitive/Psychiatric: Alert and oriented with delightfully pleasant affect.    DIAGNOSTICS:   Recent Results (from the past 240 hour(s))   Comprehensive metabolic panel    Collection Time: 01/30/23 10:21 AM   Result Value Ref Range    Sodium 142 136 - 145 mmol/L    Potassium 4.2 3.4 - 5.3 mmol/L    Chloride 105 98 - 107 mmol/L    Carbon Dioxide (CO2) 22 22 - 29 mmol/L    Anion Gap 15 7 - 15 mmol/L    Urea Nitrogen 24.5 (H) 8.0 - 23.0 mg/dL    Creatinine 0.87 0.51 - 0.95 mg/dL    Calcium 9.3 8.2 - 9.6 mg/dL    Glucose 124 (H) 70 - 99 mg/dL    Alkaline Phosphatase 51 35 - 104 U/L    AST 34 10 - 35 U/L    ALT 19 10 - 35 U/L    Protein Total 5.7 (L) 6.4 - 8.3 g/dL    Albumin 3.4 (L) 3.5 - 5.2 g/dL    Bilirubin Total 0.3 <=1.2 mg/dL    GFR Estimate 63 >60 mL/min/1.73m2       ASSESSMENT/Plan:      ICD-10-CM    1. Left leg weakness  R29.898       2. Lumbar spondylosis with myelopathy  M47.16       3. Spinal stenosis of lumbar region without neurogenic claudication  M48.061       4. Severe protein-calorie malnutrition (H)  E43       5. Weight loss  R63.4       6. Age-related osteoporosis without current pathological fracture  M81.0       7. Essential (primary) hypertension  I10       8. Cardiac pacemaker in situ, dual chamber (2011)  Z95.0       9. Hypokalemia  E87.6       10. Hepatitis C carrier (H)  B18.2           CHANGES:    Pay close attention to blood pressures.  May need to make some changes.    CARE PLAN:    The care plan, medications, vital signs,  orders, and nursing notes have been reviewed, and all orders signed. Changes to care plan, if any, as noted. Otherwise, continue current plan of care.  Total time spent in this encounter was 36 minutes with most of the time spent addressing current issues and some discussion regarding upcoming discharge.    The above has been created using voice recognition software. Please be aware that this may unintentionally  produce inaccuracies and/or nonsensical sentences.      Electronically signed by: ANN Nguyen CNP

## 2023-02-06 ENCOUNTER — DISCHARGE SUMMARY NURSING HOME (OUTPATIENT)
Dept: GERIATRICS | Facility: CLINIC | Age: 88
End: 2023-02-06
Payer: MEDICARE

## 2023-02-06 VITALS
SYSTOLIC BLOOD PRESSURE: 147 MMHG | RESPIRATION RATE: 16 BRPM | DIASTOLIC BLOOD PRESSURE: 71 MMHG | OXYGEN SATURATION: 99 % | WEIGHT: 109.4 LBS | HEART RATE: 71 BPM | HEIGHT: 64 IN | TEMPERATURE: 98.2 F | BODY MASS INDEX: 18.68 KG/M2

## 2023-02-06 DIAGNOSIS — Z95.0 CARDIAC PACEMAKER IN SITU: ICD-10-CM

## 2023-02-06 DIAGNOSIS — R29.898 LEFT LEG WEAKNESS: Primary | ICD-10-CM

## 2023-02-06 DIAGNOSIS — E43 SEVERE PROTEIN-CALORIE MALNUTRITION (H): ICD-10-CM

## 2023-02-06 DIAGNOSIS — M81.0 AGE-RELATED OSTEOPOROSIS WITHOUT CURRENT PATHOLOGICAL FRACTURE: ICD-10-CM

## 2023-02-06 DIAGNOSIS — I10 ESSENTIAL (PRIMARY) HYPERTENSION: ICD-10-CM

## 2023-02-06 DIAGNOSIS — M47.16 LUMBAR SPONDYLOSIS WITH MYELOPATHY: ICD-10-CM

## 2023-02-06 DIAGNOSIS — E87.6 HYPOKALEMIA: ICD-10-CM

## 2023-02-06 DIAGNOSIS — R63.4 WEIGHT LOSS: ICD-10-CM

## 2023-02-06 DIAGNOSIS — M48.061 SPINAL STENOSIS OF LUMBAR REGION WITHOUT NEUROGENIC CLAUDICATION: ICD-10-CM

## 2023-02-06 DIAGNOSIS — B18.2 HEPATITIS C CARRIER (H): ICD-10-CM

## 2023-02-06 PROCEDURE — 99316 NF DSCHRG MGMT 30 MIN+: CPT | Performed by: NURSE PRACTITIONER

## 2023-02-06 NOTE — PROGRESS NOTES
[facility]    Name:   Siobhan Dawson  :   10/23/1931  MRN:    [id number]     Facility:   [facility] ([facility]) [[id number]]   Room: TCU / [place] [address]  Code Status: DNR/DNI and POLST AVAILABLE -     DOS:  2023    PCP:  Jessika Hall MD     CHIEF COMPLAINT / REASON FOR VISIT:  Chief Complaint   Patient presents with     Discharge Summary Nursing Home     Leg weakness and foot drop 2/2 severe lumbar foraminal stenosis        [facility] from 2023 until 2023 (Left leg weakness and foot drop)  [facility] TCU from 2023 until 2023 (anticipated discharge date)      HPI: Siobhan is a 89 year old female with a past medical history significant for essential hypertension, 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  She has been seen at [facility] the day prior and some tests were planned, including a CT and EMG.    At presentation, foot drop severity hadn't changed.  She described it as feeling heavy.  No tingling or loss of sensation.  No loss of bowel or bladder function.  Initial evaluation showed a normal metabolic profile, CBC with no leukocytosis but the appearance of chronic thrombocytopenia.  COVID-19 negative.  Troponin negative.  She underwent CT head without acute findings other than chronic small vessel ischemic changes, changes in perivascular space, and generalized volume loss.    CT spine revealed multilevel spondylosis with severe lumbar levocurvature with associated severe canal stenosis at L2-3, L3-4, L4-5, as well as severe left L3-4 and right L1-2 foraminal stenosis.  Evaluated by neurosurgery; however, she did not wish to undergo any surgical procedures due to her advanced age.  They did recommend CT myelogram which again demonstrated  "multilevel degeneration with areas of severe narrowing and other signs associated with severe spinal canal stenosis.      CURRENT/RECENT TCU ISSUES    Disposition  -- Looking well today. Able to go through a series of exercises in my presence.  -- Extremely pleasant.  Appreciated working with therapies.  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  -- Ambulating independently to the bathroom with a rolling walker.  -- Sleeping fairly well except for nocturia x3, her baseline.    Spinal stenosis  Left leg weakness  Lumbar spondylosis with myelopathy  -- Some discomfort in the low back, especially when getting into bed or turning.  -- She did tell me the story of what happened and why she's here.  \"My foot just stopped, and I couldn't move it. That's what I'm working on [here].\"   She is now wearing a brace on the left knee.    Age-related osteoporosis  -- Pharmacy recommended calcium with vitamin D (600/400).  She is already receiving Fosamax.    Essential hypertension  -- Blood pressures look good on amlodipine and lisinopril-hydrochlorothiazide.  A low pulse of 46 was noted; however, this cannot be confirmed, and the exact time is uncertain.  She is only on 2.5 mg of amlodipine.  Continuing assessment.    Severe protein calorie malnutrition  Weight loss  -- She has been chronically losing weight over the past few years, and she doesn't know why.  She cooks her own meals in her own apartment but feels she \"kind of gave up on that.\"    -- Receiving nutritional supplement.    Discharge planning  -- Anticipated discharge on 02/07/2023 back to [facility]. Up until her hospitalization, she had been living independently there. Home services to include PT and OT with [facility]..    ROS  -- 10 point ROS of systems including Constitutional, Eyes, Respiratory, Cardiovascular, Gastroenterology, Genitourinary, Integumentary, Muscularskeletal, " Psychiatric were all negative except for pertinent positives noted in my HPI.      History reviewed. No pertinent past medical history.           History reviewed. No pertinent family history.  Social History     Socioeconomic History     Marital status: Single   Tobacco Use     Smoking status: Never     Smokeless tobacco: Never   Substance and Sexual Activity     Drug use: No       MEDICATIONS: Reviewed from the MAR, physician orders, and/or earlier progress notes.  MED REC REQUIRED  Post Medication Reconciliation Status: medication reconcilation previously completed during another office visit    Current Outpatient Medications   Medication Sig     acetaminophen (TYLENOL) 500 MG tablet Take 500 mg by mouth every 6 hours as needed for mild pain     alendronate (FOSAMAX) 70 MG tablet [ALENDRONATE (FOSAMAX) 70 MG TABLET] Take 70 mg by mouth once a week.     amLODIPine (NORVASC) 2.5 MG tablet [AMLODIPINE (NORVASC) 2.5 MG TABLET] Take 1 tablet daily     aspirin (ASA) 81 MG EC tablet Take 81 mg by mouth daily     atorvastatin (LIPITOR) 40 MG tablet Take 40 mg by mouth daily     camphor-menthol (DERMASARRA) 0.5-0.5 % external lotion Apply topically 4 times daily as needed for skin care     ibuprofen (ADVIL/MOTRIN) 200 MG tablet Take 200 mg by mouth every 8 hours as needed for pain     lisinopril-hydrochlorothiazide (PRINZIDE,ZESTORETIC) 20-25 mg per tablet Take 1 tablet by mouth daily     multivitamin therapeutic (THERAGRAN) tablet [MULTIVITAMIN THERAPEUTIC (THERAGRAN) TABLET] Take 1 tablet by mouth daily.     potassium chloride 20 mEq TbER [POTASSIUM CHLORIDE 20 MEQ TBER] Take 1 tablet by mouth daily.     No current facility-administered medications for this visit.     ALLERGIES: No Known Allergies    DIET: Regular, regular texture, thin liquids.  Additional nutritional supplementation.    Vitals:    02/06/23 1152   BP: (!) 147/71   Pulse: 71   Resp: 16   Temp: 98.2  F (36.8  C)   SpO2: 99%   Weight: 49.6 kg (109 lb 6.4  "oz)   Height: 1.626 m (5' 4\")     Wt Readings from Last 4 Encounters:   02/06/23 49.6 kg (109 lb 6.4 oz)   02/02/23 49.6 kg (109 lb 6.4 oz)   01/23/23 49.6 kg (109 lb 6.4 oz)   01/19/23 49.6 kg (109 lb 6.4 oz)     Body mass index is 18.78 kg/m .  Body surface area is 1.5 meters squared.    EXAMINATION:   General: Extremely pleasant, alert, and conversant elderly female, up in a recliner, in no apparent distress.  Head: Normocephalic and atraumatic.   Eyes: PERRLA, sclerae clear.   ENT: Moist oral mucosa.  She has her own teeth.  No nasal discharge.  Binaural hearing aids not working currently, so she is using a Pocket Talker.  Cardiovascular: RRR with very short 2/6 DAVIN at the LSB.   Respiratory: Lungs CTAB.   Abdomen: Nondistended.   Musculoskeletal/Extremities: Age-related arthritic changes.  Bilateral lower extremities look a bit (trace to 1+) swollen. Left leg is particularly taut and shiny  Integument: No rashes, clinically significant lesions, or skin breakdown.   Cognitive/Psychiatric: Alert and oriented with delightfully pleasant affect.    DIAGNOSTICS:   Recent Results (from the past 240 hour(s))   Comprehensive metabolic panel    Collection Time: 01/30/23 10:21 AM   Result Value Ref Range    Sodium 142 136 - 145 mmol/L    Potassium 4.2 3.4 - 5.3 mmol/L    Chloride 105 98 - 107 mmol/L    Carbon Dioxide (CO2) 22 22 - 29 mmol/L    Anion Gap 15 7 - 15 mmol/L    Urea Nitrogen 24.5 (H) 8.0 - 23.0 mg/dL    Creatinine 0.87 0.51 - 0.95 mg/dL    Calcium 9.3 8.2 - 9.6 mg/dL    Glucose 124 (H) 70 - 99 mg/dL    Alkaline Phosphatase 51 35 - 104 U/L    AST 34 10 - 35 U/L    ALT 19 10 - 35 U/L    Protein Total 5.7 (L) 6.4 - 8.3 g/dL    Albumin 3.4 (L) 3.5 - 5.2 g/dL    Bilirubin Total 0.3 <=1.2 mg/dL    GFR Estimate 63 >60 mL/min/1.73m2       ASSESSMENT/Plan:      ICD-10-CM    1. Left leg weakness  R29.898       2. Lumbar spondylosis with myelopathy  M47.16       3. Spinal stenosis of lumbar region without neurogenic " claudication  M48.061       4. Severe protein-calorie malnutrition (H)  E43       5. Weight loss  R63.4       6. Age-related osteoporosis without current pathological fracture  M81.0       7. Essential (primary) hypertension  I10       8. Cardiac pacemaker in situ, dual chamber (2011)  Z95.0       9. Hypokalemia  E87.6       10. Hepatitis C carrier (H)  B18.2           DISCHARGE FACE TO FACE:  I certify that this patient is under my care and that I had a face-to-face encounter that meets the physician face-to-face encounter requirements with this patient.     Date of Face-to-Face Encounter:  02/06/2023     I certify that, based on my findings, the following services are medically necessary home health services:  Home PT and OT    My clinical findings support the need for the above skilled services because: (Please write a brief narrative summary that describes what the RN, PT, SLP, or other services will be doing in the home. A list of diagnoses in this section does not meet the CMS requirements):  Home PT for strengthening, balance, endurance, and safety with mobility/ambulation; OT for strengthening, ADL needs, adaptive equipment, and safety.    This patient is homebound because: (Please write a brief narrative summmary describing the functional limitations as to why this patient is homebound and specifically what makes this patient homebound.):  Above services necessarily need to be performed in the home to be of benefit.    The patient is, or has been, under my care and I have initiated the establishment of the plan of care. This patient will be followed by a physician who will periodically review the plan of care.  Initial follow-up should be within 7-10 days.    Approximate time spent with this patient was greater than 30 minutes with greater than 50% spent in discussions regarding services required upon discharge.    The above has been created using voice recognition software. Please be aware that this may  unintentionally  produce inaccuracies and/or nonsensical sentences.      Electronically signed by: ANN Nguyen CNP

## 2023-02-06 NOTE — LETTER
2023        RE: Siobhan Dawson  [address]  [place] [address]        [facility]    Name:   Siobhan Dawson  :   10/23/1931  MRN:    [id number]     Facility:   [facility] (SNF) [[id number]]   Room: TCU / [place] [address]  Code Status: DNR/DNI and POLST AVAILABLE -     DOS:  2023    PCP:  Jessika Hall MD     CHIEF COMPLAINT / REASON FOR VISIT:  Chief Complaint   Patient presents with     Discharge Summary Nursing Home     Leg weakness and foot drop 2/2 severe lumbar foraminal stenosis        [facility] from 2023 until 2023 (Left leg weakness and foot drop)  [facility] TCU from 2023 until 2023 (anticipated discharge date)      HPI: Siobhan is a 89 year old female with a past medical history significant for essential hypertension, 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  She has been seen at [facility] the day prior and some tests were planned, including a CT and EMG.    At presentation, foot drop severity hadn't changed.  She described it as feeling heavy.  No tingling or loss of sensation.  No loss of bowel or bladder function.  Initial evaluation showed a normal metabolic profile, CBC with no leukocytosis but the appearance of chronic thrombocytopenia.  COVID-19 negative.  Troponin negative.  She underwent CT head without acute findings other than chronic small vessel ischemic changes, changes in perivascular space, and generalized volume loss.    CT spine revealed multilevel spondylosis with severe lumbar levocurvature with associated severe canal stenosis at L2-3, L3-4, L4-5, as well as severe left L3-4 and right L1-2 foraminal stenosis.  Evaluated by neurosurgery; however, she did not wish to undergo any surgical  "procedures due to her advanced age.  They did recommend CT myelogram which again demonstrated multilevel degeneration with areas of severe narrowing and other signs associated with severe spinal canal stenosis.      CURRENT/RECENT TCU ISSUES    Disposition  -- Looking well today. Able to go through a series of exercises in my presence.  -- Extremely pleasant.  Appreciated working with therapies.  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  -- Ambulating independently to the bathroom with a rolling walker.  -- Sleeping fairly well except for nocturia x3, her baseline.    Spinal stenosis  Left leg weakness  Lumbar spondylosis with myelopathy  -- Some discomfort in the low back, especially when getting into bed or turning.  -- She did tell me the story of what happened and why she's here.  \"My foot just stopped, and I couldn't move it. That's what I'm working on [here].\"   She is now wearing a brace on the left knee.    Age-related osteoporosis  -- Pharmacy recommended calcium with vitamin D (600/400).  She is already receiving Fosamax.    Essential hypertension  -- Blood pressures look good on amlodipine and lisinopril-hydrochlorothiazide.  A low pulse of 46 was noted; however, this cannot be confirmed, and the exact time is uncertain.  She is only on 2.5 mg of amlodipine.  Continuing assessment.    Severe protein calorie malnutrition  Weight loss  -- She has been chronically losing weight over the past few years, and she doesn't know why.  She cooks her own meals in her own apartment but feels she \"kind of gave up on that.\"    -- Receiving nutritional supplement.    Discharge planning  -- Anticipated discharge on 02/07/2023 back to [facility]. Up until her hospitalization, she had been living independently there. Home services to include PT and OT with [facility]..    ROS  -- 10 point ROS of systems including Constitutional, Eyes, " Respiratory, Cardiovascular, Gastroenterology, Genitourinary, Integumentary, Muscularskeletal, Psychiatric were all negative except for pertinent positives noted in my HPI.      History reviewed. No pertinent past medical history.           History reviewed. No pertinent family history.  Social History     Socioeconomic History     Marital status: Single   Tobacco Use     Smoking status: Never     Smokeless tobacco: Never   Substance and Sexual Activity     Drug use: No       MEDICATIONS: Reviewed from the MAR, physician orders, and/or earlier progress notes.  MED REC REQUIRED  Post Medication Reconciliation Status: medication reconcilation previously completed during another office visit    Current Outpatient Medications   Medication Sig     acetaminophen (TYLENOL) 500 MG tablet Take 500 mg by mouth every 6 hours as needed for mild pain     alendronate (FOSAMAX) 70 MG tablet [ALENDRONATE (FOSAMAX) 70 MG TABLET] Take 70 mg by mouth once a week.     amLODIPine (NORVASC) 2.5 MG tablet [AMLODIPINE (NORVASC) 2.5 MG TABLET] Take 1 tablet daily     aspirin (ASA) 81 MG EC tablet Take 81 mg by mouth daily     atorvastatin (LIPITOR) 40 MG tablet Take 40 mg by mouth daily     camphor-menthol (DERMASARRA) 0.5-0.5 % external lotion Apply topically 4 times daily as needed for skin care     ibuprofen (ADVIL/MOTRIN) 200 MG tablet Take 200 mg by mouth every 8 hours as needed for pain     lisinopril-hydrochlorothiazide (PRINZIDE,ZESTORETIC) 20-25 mg per tablet Take 1 tablet by mouth daily     multivitamin therapeutic (THERAGRAN) tablet [MULTIVITAMIN THERAPEUTIC (THERAGRAN) TABLET] Take 1 tablet by mouth daily.     potassium chloride 20 mEq TbER [POTASSIUM CHLORIDE 20 MEQ TBER] Take 1 tablet by mouth daily.     No current facility-administered medications for this visit.     ALLERGIES: No Known Allergies    DIET: Regular, regular texture, thin liquids.  Additional nutritional supplementation.    Vitals:    02/06/23 1152   BP: (!)  "147/71   Pulse: 71   Resp: 16   Temp: 98.2  F (36.8  C)   SpO2: 99%   Weight: 49.6 kg (109 lb 6.4 oz)   Height: 1.626 m (5' 4\")     Wt Readings from Last 4 Encounters:   02/06/23 49.6 kg (109 lb 6.4 oz)   02/02/23 49.6 kg (109 lb 6.4 oz)   01/23/23 49.6 kg (109 lb 6.4 oz)   01/19/23 49.6 kg (109 lb 6.4 oz)     Body mass index is 18.78 kg/m .  Body surface area is 1.5 meters squared.    EXAMINATION:   General: Extremely pleasant, alert, and conversant elderly female, up in a recliner, in no apparent distress.  Head: Normocephalic and atraumatic.   Eyes: PERRLA, sclerae clear.   ENT: Moist oral mucosa.  She has her own teeth.  No nasal discharge.  Binaural hearing aids not working currently, so she is using a Pocket Talker.  Cardiovascular: RRR with very short 2/6 DAVIN at the LSB.   Respiratory: Lungs CTAB.   Abdomen: Nondistended.   Musculoskeletal/Extremities: Age-related arthritic changes.  Bilateral lower extremities look a bit (trace to 1+) swollen. Left leg is particularly taut and shiny  Integument: No rashes, clinically significant lesions, or skin breakdown.   Cognitive/Psychiatric: Alert and oriented with delightfully pleasant affect.    DIAGNOSTICS:   Recent Results (from the past 240 hour(s))   Comprehensive metabolic panel    Collection Time: 01/30/23 10:21 AM   Result Value Ref Range    Sodium 142 136 - 145 mmol/L    Potassium 4.2 3.4 - 5.3 mmol/L    Chloride 105 98 - 107 mmol/L    Carbon Dioxide (CO2) 22 22 - 29 mmol/L    Anion Gap 15 7 - 15 mmol/L    Urea Nitrogen 24.5 (H) 8.0 - 23.0 mg/dL    Creatinine 0.87 0.51 - 0.95 mg/dL    Calcium 9.3 8.2 - 9.6 mg/dL    Glucose 124 (H) 70 - 99 mg/dL    Alkaline Phosphatase 51 35 - 104 U/L    AST 34 10 - 35 U/L    ALT 19 10 - 35 U/L    Protein Total 5.7 (L) 6.4 - 8.3 g/dL    Albumin 3.4 (L) 3.5 - 5.2 g/dL    Bilirubin Total 0.3 <=1.2 mg/dL    GFR Estimate 63 >60 mL/min/1.73m2       ASSESSMENT/Plan:      ICD-10-CM    1. Left leg weakness  R29.898       2. Lumbar " spondylosis with myelopathy  M47.16       3. Spinal stenosis of lumbar region without neurogenic claudication  M48.061       4. Severe protein-calorie malnutrition (H)  E43       5. Weight loss  R63.4       6. Age-related osteoporosis without current pathological fracture  M81.0       7. Essential (primary) hypertension  I10       8. Cardiac pacemaker in situ, dual chamber (2011)  Z95.0       9. Hypokalemia  E87.6       10. Hepatitis C carrier (H)  B18.2           DISCHARGE FACE TO FACE:  I certify that this patient is under my care and that I had a face-to-face encounter that meets the physician face-to-face encounter requirements with this patient.     Date of Face-to-Face Encounter:  02/06/2023     I certify that, based on my findings, the following services are medically necessary home health services:  Home PT and OT    My clinical findings support the need for the above skilled services because: (Please write a brief narrative summary that describes what the RN, PT, SLP, or other services will be doing in the home. A list of diagnoses in this section does not meet the CMS requirements):  Home PT for strengthening, balance, endurance, and safety with mobility/ambulation; OT for strengthening, ADL needs, adaptive equipment, and safety.    This patient is homebound because: (Please write a brief narrative summmary describing the functional limitations as to why this patient is homebound and specifically what makes this patient homebound.):  Above services necessarily need to be performed in the home to be of benefit.    The patient is, or has been, under my care and I have initiated the establishment of the plan of care. This patient will be followed by a physician who will periodically review the plan of care.  Initial follow-up should be within 7-10 days.    Approximate time spent with this patient was greater than 30 minutes with greater than 50% spent in discussions regarding services required upon  discharge.    The above has been created using voice recognition software. Please be aware that this may unintentionally  produce inaccuracies and/or nonsensical sentences.      Electronically signed by: ANN Nguyen CNP        Sincerely,        ANN Nguyen CNP

## 2023-02-22 ENCOUNTER — ANCILLARY PROCEDURE (OUTPATIENT)
Dept: CARDIOLOGY | Facility: CLINIC | Age: 88
End: 2023-02-22
Payer: MEDICARE

## 2023-02-22 DIAGNOSIS — Z95.0 PACEMAKER: ICD-10-CM

## 2023-02-22 DIAGNOSIS — I44.1 SECOND DEGREE AV BLOCK: ICD-10-CM

## 2023-02-22 PROCEDURE — 93293 PM PHONE R-STRIP DEVICE EVAL: CPT | Performed by: INTERNAL MEDICINE

## 2023-03-11 ENCOUNTER — LAB REQUISITION (OUTPATIENT)
Dept: LAB | Facility: CLINIC | Age: 88
End: 2023-03-11
Payer: MEDICARE

## 2023-03-11 DIAGNOSIS — I10 ESSENTIAL (PRIMARY) HYPERTENSION: ICD-10-CM

## 2023-03-11 DIAGNOSIS — D64.9 ANEMIA, UNSPECIFIED: ICD-10-CM

## 2023-03-11 DIAGNOSIS — E03.9 HYPOTHYROIDISM, UNSPECIFIED: ICD-10-CM

## 2023-03-14 LAB
ANION GAP SERPL CALCULATED.3IONS-SCNC: 14 MMOL/L (ref 7–15)
BUN SERPL-MCNC: 20.4 MG/DL (ref 8–23)
CALCIUM SERPL-MCNC: 9.4 MG/DL (ref 8.2–9.6)
CHLORIDE SERPL-SCNC: 101 MMOL/L (ref 98–107)
CREAT SERPL-MCNC: 0.81 MG/DL (ref 0.51–0.95)
DEPRECATED HCO3 PLAS-SCNC: 24 MMOL/L (ref 22–29)
ERYTHROCYTE [DISTWIDTH] IN BLOOD BY AUTOMATED COUNT: 13.2 % (ref 10–15)
GFR SERPL CREATININE-BSD FRML MDRD: 68 ML/MIN/1.73M2
GLUCOSE SERPL-MCNC: 80 MG/DL (ref 70–99)
HCT VFR BLD AUTO: 45 % (ref 35–47)
HGB BLD-MCNC: 13.9 G/DL (ref 11.7–15.7)
MCH RBC QN AUTO: 29.4 PG (ref 26.5–33)
MCHC RBC AUTO-ENTMCNC: 30.9 G/DL (ref 31.5–36.5)
MCV RBC AUTO: 95 FL (ref 78–100)
PLATELET # BLD AUTO: 169 10E3/UL (ref 150–450)
POTASSIUM SERPL-SCNC: 4.2 MMOL/L (ref 3.4–5.3)
RBC # BLD AUTO: 4.73 10E6/UL (ref 3.8–5.2)
SODIUM SERPL-SCNC: 139 MMOL/L (ref 136–145)
TSH SERPL DL<=0.005 MIU/L-ACNC: 1.29 UIU/ML (ref 0.3–4.2)
WBC # BLD AUTO: 5.9 10E3/UL (ref 4–11)

## 2023-03-14 PROCEDURE — 80048 BASIC METABOLIC PNL TOTAL CA: CPT | Mod: ORL | Performed by: NURSE PRACTITIONER

## 2023-03-14 PROCEDURE — 84443 ASSAY THYROID STIM HORMONE: CPT | Mod: ORL | Performed by: NURSE PRACTITIONER

## 2023-03-14 PROCEDURE — 36415 COLL VENOUS BLD VENIPUNCTURE: CPT | Mod: ORL | Performed by: NURSE PRACTITIONER

## 2023-03-14 PROCEDURE — P9604 ONE-WAY ALLOW PRORATED TRIP: HCPCS | Mod: ORL | Performed by: NURSE PRACTITIONER

## 2023-03-14 PROCEDURE — 85027 COMPLETE CBC AUTOMATED: CPT | Mod: ORL | Performed by: NURSE PRACTITIONER

## 2023-06-01 ENCOUNTER — ANCILLARY PROCEDURE (OUTPATIENT)
Dept: CARDIOLOGY | Facility: CLINIC | Age: 88
End: 2023-06-01
Payer: MEDICARE

## 2023-06-01 DIAGNOSIS — Z95.0 PACEMAKER: ICD-10-CM

## 2023-06-01 DIAGNOSIS — Z95.0 CARDIAC PACEMAKER IN SITU: Primary | ICD-10-CM

## 2023-06-01 DIAGNOSIS — I44.1 SECOND DEGREE AV BLOCK: ICD-10-CM

## 2023-06-01 LAB
MDC_IDC_LEAD_IMPLANT_DT: NORMAL
MDC_IDC_LEAD_IMPLANT_DT: NORMAL
MDC_IDC_LEAD_LOCATION: NORMAL
MDC_IDC_LEAD_LOCATION: NORMAL
MDC_IDC_LEAD_LOCATION_DETAIL_1: NORMAL
MDC_IDC_LEAD_LOCATION_DETAIL_1: NORMAL
MDC_IDC_LEAD_MFG: NORMAL
MDC_IDC_LEAD_MFG: NORMAL
MDC_IDC_LEAD_MODEL: NORMAL
MDC_IDC_LEAD_MODEL: NORMAL
MDC_IDC_LEAD_POLARITY_TYPE: NORMAL
MDC_IDC_LEAD_POLARITY_TYPE: NORMAL
MDC_IDC_LEAD_SERIAL: NORMAL
MDC_IDC_LEAD_SERIAL: NORMAL
MDC_IDC_MSMT_BATTERY_DTM: NORMAL
MDC_IDC_MSMT_BATTERY_REMAINING_LONGEVITY: 42 MO
MDC_IDC_MSMT_BATTERY_STATUS: NORMAL
MDC_IDC_MSMT_LEADCHNL_RA_IMPEDANCE_VALUE: 580 OHM
MDC_IDC_MSMT_LEADCHNL_RA_PACING_THRESHOLD_AMPLITUDE: 1 V
MDC_IDC_MSMT_LEADCHNL_RA_PACING_THRESHOLD_PULSEWIDTH: 0.4 MS
MDC_IDC_MSMT_LEADCHNL_RA_SENSING_INTR_AMPL: 1.5 MV
MDC_IDC_MSMT_LEADCHNL_RV_IMPEDANCE_VALUE: 430 OHM
MDC_IDC_MSMT_LEADCHNL_RV_PACING_THRESHOLD_AMPLITUDE: 1.2 V
MDC_IDC_MSMT_LEADCHNL_RV_PACING_THRESHOLD_PULSEWIDTH: 0.4 MS
MDC_IDC_MSMT_LEADCHNL_RV_SENSING_INTR_AMPL: 7.6 MV
MDC_IDC_PG_IMPLANT_DTM: NORMAL
MDC_IDC_PG_MFG: NORMAL
MDC_IDC_PG_MODEL: NORMAL
MDC_IDC_PG_SERIAL: NORMAL
MDC_IDC_PG_TYPE: NORMAL
MDC_IDC_SESS_CLINIC_NAME: NORMAL
MDC_IDC_SESS_DTM: NORMAL
MDC_IDC_SESS_TYPE: NORMAL
MDC_IDC_SET_BRADY_AT_MODE_SWITCH_MODE: NORMAL
MDC_IDC_SET_BRADY_AT_MODE_SWITCH_RATE: 170 {BEATS}/MIN
MDC_IDC_SET_BRADY_HYSTRATE: NORMAL
MDC_IDC_SET_BRADY_LOWRATE: 60 {BEATS}/MIN
MDC_IDC_SET_BRADY_MAX_TRACKING_RATE: 130 {BEATS}/MIN
MDC_IDC_SET_BRADY_MODE: NORMAL
MDC_IDC_SET_BRADY_PAV_DELAY_HIGH: 110 MS
MDC_IDC_SET_BRADY_PAV_DELAY_LOW: 300 MS
MDC_IDC_SET_BRADY_SAV_DELAY_LOW: 280 MS
MDC_IDC_SET_LEADCHNL_RA_PACING_AMPLITUDE: 1.5 V
MDC_IDC_SET_LEADCHNL_RA_PACING_CAPTURE_MODE: NORMAL
MDC_IDC_SET_LEADCHNL_RA_PACING_POLARITY: NORMAL
MDC_IDC_SET_LEADCHNL_RA_PACING_PULSEWIDTH: 0.4 MS
MDC_IDC_SET_LEADCHNL_RA_SENSING_ADAPTATION_MODE: NORMAL
MDC_IDC_SET_LEADCHNL_RA_SENSING_POLARITY: NORMAL
MDC_IDC_SET_LEADCHNL_RA_SENSING_SENSITIVITY: 0.25 MV
MDC_IDC_SET_LEADCHNL_RV_PACING_AMPLITUDE: 2 V
MDC_IDC_SET_LEADCHNL_RV_PACING_CAPTURE_MODE: NORMAL
MDC_IDC_SET_LEADCHNL_RV_PACING_POLARITY: NORMAL
MDC_IDC_SET_LEADCHNL_RV_PACING_PULSEWIDTH: 0.4 MS
MDC_IDC_SET_LEADCHNL_RV_SENSING_ADAPTATION_MODE: NORMAL
MDC_IDC_SET_LEADCHNL_RV_SENSING_POLARITY: NORMAL
MDC_IDC_SET_LEADCHNL_RV_SENSING_SENSITIVITY: 2.5 MV
MDC_IDC_STAT_AT_MODE_SW_COUNT: 0
MDC_IDC_STAT_BRADY_DTM_END: NORMAL
MDC_IDC_STAT_BRADY_DTM_START: NORMAL
MDC_IDC_STAT_BRADY_RA_PERCENT_PACED: 15 %
MDC_IDC_STAT_BRADY_RV_PERCENT_PACED: 1 %
MDC_IDC_STAT_EPISODE_RECENT_COUNT: 0
MDC_IDC_STAT_EPISODE_TYPE: NORMAL
MDC_IDC_STAT_EPISODE_VENDOR_TYPE: NORMAL

## 2023-06-01 PROCEDURE — 93280 PM DEVICE PROGR EVAL DUAL: CPT | Performed by: INTERNAL MEDICINE

## 2023-09-06 ENCOUNTER — ANCILLARY PROCEDURE (OUTPATIENT)
Dept: CARDIOLOGY | Facility: CLINIC | Age: 88
End: 2023-09-06
Attending: INTERNAL MEDICINE
Payer: MEDICARE

## 2023-09-06 DIAGNOSIS — Z95.0 CARDIAC PACEMAKER IN SITU: ICD-10-CM

## 2023-09-06 PROCEDURE — 93293 PM PHONE R-STRIP DEVICE EVAL: CPT | Performed by: INTERNAL MEDICINE

## 2023-09-13 ENCOUNTER — TRANSFERRED RECORDS (OUTPATIENT)
Dept: HEALTH INFORMATION MANAGEMENT | Facility: CLINIC | Age: 88
End: 2023-09-13

## 2024-01-07 ENCOUNTER — LAB REQUISITION (OUTPATIENT)
Dept: LAB | Facility: CLINIC | Age: 89
End: 2024-01-07
Payer: COMMERCIAL

## 2024-01-07 DIAGNOSIS — I10 ESSENTIAL (PRIMARY) HYPERTENSION: ICD-10-CM

## 2024-01-07 DIAGNOSIS — D64.9 ANEMIA, UNSPECIFIED: ICD-10-CM

## 2024-01-09 LAB
ANION GAP SERPL CALCULATED.3IONS-SCNC: 12 MMOL/L (ref 7–15)
BUN SERPL-MCNC: 24.5 MG/DL (ref 8–23)
CALCIUM SERPL-MCNC: 8.8 MG/DL (ref 8.2–9.6)
CHLORIDE SERPL-SCNC: 106 MMOL/L (ref 98–107)
CREAT SERPL-MCNC: 0.71 MG/DL (ref 0.51–0.95)
DEPRECATED HCO3 PLAS-SCNC: 24 MMOL/L (ref 22–29)
EGFRCR SERPLBLD CKD-EPI 2021: 79 ML/MIN/1.73M2
ERYTHROCYTE [DISTWIDTH] IN BLOOD BY AUTOMATED COUNT: 12 % (ref 10–15)
GLUCOSE SERPL-MCNC: 75 MG/DL (ref 70–99)
HCT VFR BLD AUTO: 42.5 % (ref 35–47)
HGB BLD-MCNC: 13.8 G/DL (ref 11.7–15.7)
MCH RBC QN AUTO: 30.1 PG (ref 26.5–33)
MCHC RBC AUTO-ENTMCNC: 32.5 G/DL (ref 31.5–36.5)
MCV RBC AUTO: 93 FL (ref 78–100)
PLATELET # BLD AUTO: 176 10E3/UL (ref 150–450)
POTASSIUM SERPL-SCNC: 3.7 MMOL/L (ref 3.4–5.3)
RBC # BLD AUTO: 4.59 10E6/UL (ref 3.8–5.2)
SODIUM SERPL-SCNC: 142 MMOL/L (ref 135–145)
WBC # BLD AUTO: 7.1 10E3/UL (ref 4–11)

## 2024-01-09 PROCEDURE — 85027 COMPLETE CBC AUTOMATED: CPT | Mod: ORL | Performed by: NURSE PRACTITIONER

## 2024-01-09 PROCEDURE — 36415 COLL VENOUS BLD VENIPUNCTURE: CPT | Mod: ORL | Performed by: NURSE PRACTITIONER

## 2024-01-09 PROCEDURE — 80048 BASIC METABOLIC PNL TOTAL CA: CPT | Mod: ORL | Performed by: NURSE PRACTITIONER

## 2024-01-09 PROCEDURE — P9604 ONE-WAY ALLOW PRORATED TRIP: HCPCS | Mod: ORL | Performed by: NURSE PRACTITIONER

## 2024-03-21 DIAGNOSIS — Z95.0 PACEMAKER: ICD-10-CM

## 2024-03-21 DIAGNOSIS — Z95.0 CARDIAC PACEMAKER IN SITU: Primary | ICD-10-CM

## 2024-03-21 DIAGNOSIS — I44.1 SECOND DEGREE AV BLOCK: ICD-10-CM

## 2024-06-03 ENCOUNTER — OFFICE VISIT (OUTPATIENT)
Dept: CARDIOLOGY | Facility: CLINIC | Age: 89
End: 2024-06-03
Attending: INTERNAL MEDICINE
Payer: COMMERCIAL

## 2024-06-03 VITALS
BODY MASS INDEX: 21.63 KG/M2 | RESPIRATION RATE: 14 BRPM | WEIGHT: 126 LBS | SYSTOLIC BLOOD PRESSURE: 118 MMHG | HEART RATE: 70 BPM | DIASTOLIC BLOOD PRESSURE: 60 MMHG

## 2024-06-03 DIAGNOSIS — Z95.0 PACEMAKER: ICD-10-CM

## 2024-06-03 DIAGNOSIS — I48.0 PAROXYSMAL ATRIAL FIBRILLATION (H): Primary | ICD-10-CM

## 2024-06-03 DIAGNOSIS — I10 ESSENTIAL (PRIMARY) HYPERTENSION: ICD-10-CM

## 2024-06-03 DIAGNOSIS — Z95.0 CARDIAC PACEMAKER IN SITU: ICD-10-CM

## 2024-06-03 LAB
MDC_IDC_LEAD_CONNECTION_STATUS: NORMAL
MDC_IDC_LEAD_CONNECTION_STATUS: NORMAL
MDC_IDC_LEAD_IMPLANT_DT: NORMAL
MDC_IDC_LEAD_IMPLANT_DT: NORMAL
MDC_IDC_LEAD_LOCATION: NORMAL
MDC_IDC_LEAD_LOCATION: NORMAL
MDC_IDC_LEAD_LOCATION_DETAIL_1: NORMAL
MDC_IDC_LEAD_LOCATION_DETAIL_1: NORMAL
MDC_IDC_LEAD_MFG: NORMAL
MDC_IDC_LEAD_MFG: NORMAL
MDC_IDC_LEAD_MODEL: NORMAL
MDC_IDC_LEAD_MODEL: NORMAL
MDC_IDC_LEAD_POLARITY_TYPE: NORMAL
MDC_IDC_LEAD_POLARITY_TYPE: NORMAL
MDC_IDC_LEAD_SERIAL: NORMAL
MDC_IDC_LEAD_SERIAL: NORMAL
MDC_IDC_MSMT_BATTERY_DTM: NORMAL
MDC_IDC_MSMT_BATTERY_REMAINING_LONGEVITY: 36 MO
MDC_IDC_MSMT_BATTERY_STATUS: NORMAL
MDC_IDC_MSMT_LEADCHNL_RA_IMPEDANCE_VALUE: 600 OHM
MDC_IDC_MSMT_LEADCHNL_RA_PACING_THRESHOLD_AMPLITUDE: 1 V
MDC_IDC_MSMT_LEADCHNL_RA_PACING_THRESHOLD_PULSEWIDTH: 0.4 MS
MDC_IDC_MSMT_LEADCHNL_RA_SENSING_INTR_AMPL: 1.4 MV
MDC_IDC_MSMT_LEADCHNL_RV_IMPEDANCE_VALUE: 430 OHM
MDC_IDC_MSMT_LEADCHNL_RV_PACING_THRESHOLD_AMPLITUDE: 1 V
MDC_IDC_MSMT_LEADCHNL_RV_PACING_THRESHOLD_PULSEWIDTH: 0.4 MS
MDC_IDC_MSMT_LEADCHNL_RV_SENSING_INTR_AMPL: 7.4 MV
MDC_IDC_PG_IMPLANT_DTM: NORMAL
MDC_IDC_PG_MFG: NORMAL
MDC_IDC_PG_MODEL: NORMAL
MDC_IDC_PG_SERIAL: NORMAL
MDC_IDC_PG_TYPE: NORMAL
MDC_IDC_SESS_CLINIC_NAME: NORMAL
MDC_IDC_SESS_DTM: NORMAL
MDC_IDC_SESS_TYPE: NORMAL
MDC_IDC_SET_BRADY_AT_MODE_SWITCH_MODE: NORMAL
MDC_IDC_SET_BRADY_AT_MODE_SWITCH_RATE: 170 {BEATS}/MIN
MDC_IDC_SET_BRADY_HYSTRATE: NORMAL
MDC_IDC_SET_BRADY_LOWRATE: 60 {BEATS}/MIN
MDC_IDC_SET_BRADY_MAX_TRACKING_RATE: 130 {BEATS}/MIN
MDC_IDC_SET_BRADY_MODE: NORMAL
MDC_IDC_SET_BRADY_PAV_DELAY_HIGH: 110 MS
MDC_IDC_SET_BRADY_PAV_DELAY_LOW: 300 MS
MDC_IDC_SET_BRADY_SAV_DELAY_LOW: 280 MS
MDC_IDC_SET_LEADCHNL_RA_PACING_AMPLITUDE: 1.5 V
MDC_IDC_SET_LEADCHNL_RA_PACING_CAPTURE_MODE: NORMAL
MDC_IDC_SET_LEADCHNL_RA_PACING_POLARITY: NORMAL
MDC_IDC_SET_LEADCHNL_RA_PACING_PULSEWIDTH: 0.4 MS
MDC_IDC_SET_LEADCHNL_RA_SENSING_ADAPTATION_MODE: NORMAL
MDC_IDC_SET_LEADCHNL_RA_SENSING_POLARITY: NORMAL
MDC_IDC_SET_LEADCHNL_RA_SENSING_SENSITIVITY: 0.25 MV
MDC_IDC_SET_LEADCHNL_RV_PACING_AMPLITUDE: 2 V
MDC_IDC_SET_LEADCHNL_RV_PACING_CAPTURE_MODE: NORMAL
MDC_IDC_SET_LEADCHNL_RV_PACING_POLARITY: NORMAL
MDC_IDC_SET_LEADCHNL_RV_PACING_PULSEWIDTH: 0.4 MS
MDC_IDC_SET_LEADCHNL_RV_SENSING_ADAPTATION_MODE: NORMAL
MDC_IDC_SET_LEADCHNL_RV_SENSING_POLARITY: NORMAL
MDC_IDC_SET_LEADCHNL_RV_SENSING_SENSITIVITY: 2.5 MV
MDC_IDC_STAT_AT_DTM_END: NORMAL
MDC_IDC_STAT_AT_DTM_START: NORMAL
MDC_IDC_STAT_AT_MODE_SW_COUNT: 6
MDC_IDC_STAT_AT_MODE_SW_MAX_DURATION: NORMAL S
MDC_IDC_STAT_BRADY_DTM_END: NORMAL
MDC_IDC_STAT_BRADY_DTM_START: NORMAL
MDC_IDC_STAT_BRADY_RA_PERCENT_PACED: 10 %
MDC_IDC_STAT_BRADY_RV_PERCENT_PACED: 2 %
MDC_IDC_STAT_EPISODE_RECENT_COUNT: 0
MDC_IDC_STAT_EPISODE_RECENT_COUNT: 6
MDC_IDC_STAT_EPISODE_RECENT_COUNT_DTM_END: NORMAL
MDC_IDC_STAT_EPISODE_RECENT_COUNT_DTM_END: NORMAL
MDC_IDC_STAT_EPISODE_RECENT_COUNT_DTM_START: NORMAL
MDC_IDC_STAT_EPISODE_RECENT_COUNT_DTM_START: NORMAL
MDC_IDC_STAT_EPISODE_TYPE: NORMAL
MDC_IDC_STAT_EPISODE_TYPE: NORMAL
MDC_IDC_STAT_EPISODE_VENDOR_TYPE: NORMAL

## 2024-06-03 PROCEDURE — 99215 OFFICE O/P EST HI 40 MIN: CPT | Performed by: INTERNAL MEDICINE

## 2024-06-03 PROCEDURE — 93280 PM DEVICE PROGR EVAL DUAL: CPT | Performed by: INTERNAL MEDICINE

## 2024-06-03 RX ORDER — LIDOCAINE HCL 4% 4 G/100G
CREAM TOPICAL PRN
COMMUNITY
Start: 2023-07-13

## 2024-06-03 RX ORDER — POLYETHYLENE GLYCOL 3350 17 G/17G
1 POWDER, FOR SOLUTION ORAL DAILY
COMMUNITY

## 2024-06-03 NOTE — PROGRESS NOTES
Assessment/Plan:   Paroxysmal atrial fibrillation: This is a newly diagnosed medical condition.  She had 1 episode on September 18, 2023 when she was discharged from [facility] due to a significant fall.  Her ventricular rate has been well-controlled.  Since then, she did not have recurrent episode of atrial fibrillation.  We discussed evaluation and management of paroxysmal atrial fibrillation.  Since she had only 1 episode of atrial fibrillation, no recurrent episode, short memory loss, history of a significant fall, not start anticoagulation at this time.  If she has recurrent episode of atrial fibrillation, and then consider anticoagulation for prevention of stroke.  Both patient and her daughter-in-law agreed with the plan.  We will continue to follow-up with the report of pacemaker interrogation.  Second-degree AV block status post dual-chamber pacemaker placement: Normal device function, continue follow-up with device clinic.  Benign essential hypertension: Her blood pressure has been well-controlled with amlodipine 2.5 mg daily, lisinopril-hydrochlorothiazide 20-25 mg daily.  Short memory loss: Stable.    Total 41 minutes were spent in this visit for face to face visit, physical exam, review of current labs/imaging studies, plan for ongoing treatment with >50% spent on counseling and coordination of care as documented in the above mentioned note.    Thank you for the opportunity to be involved in the care of Siobhan Dawson. If you have any questions, please feel free to contact me.  I will see the patient again in 6 months and as needed.    Much or all of the text in this note was generated through the use of Dragon Dictate voice-to-text software. Errors in spelling or words which seem out of context are unintentional. Sound alike errors, in particular, may have escaped editing.       History of Present Illness:   It is my pleasure to see Siobhan Dawson at the [facility]  clinic for routine cardiology follow up.  Siobhan Dawson is a 89 year old female with a medical history of secondary AV block s/p dual-chamber pacemaker placement, benign essential hypertension, hepatitis C carrier, short memory loss.    The patient had a significant of fall on September 13 and was admitted to [facility], discharged on September 18, 2023.  Pacemaker interrogation today showed the patient had 7 hours of atrial fibrillation on September 18, 2023.  On that day, the patient had significant pain from injury secondary to fall.  Since then, she did not have recurrent episode of atrial fibrillation.  She denies chest pain, shortness of breath, palpitations, dizziness, orthopnea, PND or leg edema.  Her blood pressure and heart rate are controlled.    Past Medical History:     Patient Active Problem List   Diagnosis    Essential (primary) hypertension    S/P placement of cardiac pacemaker    Cadiac pacemaker in situ, dual chamber    Carrier Of Viral Hepatitis Type C    Second degree AV block    Spinal stenosis of lumbar region without neurogenic claudication    Left leg weakness    Hyperlipidemia, unspecified    Lumbar spondylosis with myelopathy    Severe protein-calorie malnutrition (H24)    Weight loss    Age-related osteoporosis without current pathological fracture    Hypokalemia    Osteoporosis       Past Surgical History:   No past surgical history on file.    Family History:   Reviewed: No family history of coronary artery disease or atrial fibrillation.    Social History:    reports that she has never smoked. She has never used smokeless tobacco. She reports that she does not use drugs.    Review of Systems:   12 systems are reviewed negative except for in HPI.    Meds:     Current Outpatient Medications:     acetaminophen (TYLENOL) 500 MG tablet, Take 500 mg by mouth every 6 hours as needed for mild pain, Disp: , Rfl:     amLODIPine (NORVASC) 2.5 MG tablet, [AMLODIPINE (NORVASC) 2.5 MG TABLET]  Take 1 tablet daily, Disp: , Rfl:     camphor-menthol (DERMASARRA) 0.5-0.5 % external lotion, Apply topically 4 times daily as needed for skin care, Disp: , Rfl:     lidocaine 4 % external cream, Apply topically as needed, Disp: , Rfl:     lisinopril-hydrochlorothiazide (PRINZIDE,ZESTORETIC) 20-25 mg per tablet, Take 1 tablet by mouth daily, Disp: , Rfl:     polyethylene glycol (MIRALAX) 17 g packet, Take 1 packet by mouth daily, Disp: , Rfl:     potassium chloride 20 mEq TbER, [POTASSIUM CHLORIDE 20 MEQ TBER] Take 1 tablet by mouth daily., Disp: , Rfl:     Allergies:   Patient has no known allergies.      Objective:      Physical Exam  57.2 kg (126 lb)     Body mass index is 21.63 kg/m .  /60 (BP Location: Right arm, Patient Position: Sitting, Cuff Size: Adult Regular)   Pulse 70   Resp 14   Wt 57.2 kg (126 lb)   BMI 21.63 kg/m      General Appearance:   Awake, Alert, No acute distress.   HEENT:  Pupil equal and reactive to light. No scleral icterus; the mucous membranes were moist.   Neck: No cervical bruits. No JVD. No thyromegaly.     Chest: The spine was straight. The chest was symmetric.   Lungs:   Respirations unlabored; Lungs are clear to auscultation. No crackles. No wheezing.   Cardiovascular:   Regular rhythm and rate, normal first and second heart sounds with no murmurs. No rubs or gallops.    Abdomen:  Soft. No tenderness. Non-distended. Bowels sounds are present   Extremities: Equal tibial pulses. No leg edema.   Skin: No rashes or ulcers. Warm, Dry.   Musculoskeletal: No tenderness. No deformity.   Neurologic: Mood and affect are appropriate. No focal deficits.         Pacemaker interrogation today: Personally reviewed  Normal device function  1 episode of atrial fibrillation lasted 7 hours on September 18, 2023.  Since then no recurrent episode.        Lab Review   Lab Results   Component Value Date     01/09/2024    CO2 24 01/09/2024    CO2 26 02/07/2022    BUN 24.5 01/09/2024    BUN  "25 02/07/2022     Lab Results   Component Value Date    WBC 7.1 01/09/2024    HGB 13.8 01/09/2024    HCT 42.5 01/09/2024    MCV 93 01/09/2024     01/09/2024     Lab Results   Component Value Date    CHOL 180 02/08/2021    CHOL 175 01/27/2020    CHOL 194 01/15/2019     Lab Results   Component Value Date    HDL 67 02/08/2021    HDL 64 01/27/2020    HDL 68 01/15/2019     No components found for: \"LDLCALC\"  Lab Results   Component Value Date    TRIG 138 02/08/2021    TRIG 147 01/27/2020    TRIG 69 01/15/2019     Lab Results   Component Value Date    TSH 1.29 03/14/2023             "

## 2024-06-03 NOTE — LETTER
6/3/2024    Jenny Chan MD, MD  [address] [facility] [address]    RE: Siobhan Dawson       Dear Colleague,     I had the pleasure of seeing Siobhan Dawson in the [facility].            Assessment/Plan:   Paroxysmal atrial fibrillation: This is a newly diagnosed medical condition.  She had 1 episode on September 18, 2023 when she was discharged from [facility] due to a significant fall.  Her ventricular rate has been well-controlled.  Since then, she did not have recurrent episode of atrial fibrillation.  We discussed evaluation and management of paroxysmal atrial fibrillation.  Since she had only 1 episode of atrial fibrillation, no recurrent episode, short memory loss, history of a significant fall, not start anticoagulation at this time.  If she has recurrent episode of atrial fibrillation, and then consider anticoagulation for prevention of stroke.  Both patient and her daughter-in-law agreed with the plan.  We will continue to follow-up with the report of pacemaker interrogation.  Second-degree AV block status post dual-chamber pacemaker placement: Normal device function, continue follow-up with device clinic.  Benign essential hypertension: Her blood pressure has been well-controlled with amlodipine 2.5 mg daily, lisinopril-hydrochlorothiazide 20-25 mg daily.  Short memory loss: Stable.    Total 41 minutes were spent in this visit for face to face visit, physical exam, review of current labs/imaging studies, plan for ongoing treatment with >50% spent on counseling and coordination of care as documented in the above mentioned note.    Thank you for the opportunity to be involved in the care of Siobhan Dawson. If you have any questions, please feel free to contact me.  I will see the patient again in 6 months and as needed.    Much or all of the text in this note was generated through the use of Dragon Dictate voice-to-text software. Errors in spelling or words which seem out of  context are unintentional. Sound alike errors, in particular, may have escaped editing.       History of Present Illness:   It is my pleasure to see Siobhan Dawson at the [facility] clinic for routine cardiology follow up.  Siobhan Dawson is a 89 year old female with a medical history of secondary AV block s/p dual-chamber pacemaker placement, benign essential hypertension, hepatitis C carrier, short memory loss.    The patient had a significant of fall on September 13 and was admitted to [facility], discharged on September 18, 2023.  Pacemaker interrogation today showed the patient had 7 hours of atrial fibrillation on September 18, 2023.  On that day, the patient had significant pain from injury secondary to fall.  Since then, she did not have recurrent episode of atrial fibrillation.  She denies chest pain, shortness of breath, palpitations, dizziness, orthopnea, PND or leg edema.  Her blood pressure and heart rate are controlled.    Past Medical History:     Patient Active Problem List   Diagnosis    Essential (primary) hypertension    S/P placement of cardiac pacemaker    Cadiac pacemaker in situ, dual chamber    Carrier Of Viral Hepatitis Type C    Second degree AV block    Spinal stenosis of lumbar region without neurogenic claudication    Left leg weakness    Hyperlipidemia, unspecified    Lumbar spondylosis with myelopathy    Severe protein-calorie malnutrition (H24)    Weight loss    Age-related osteoporosis without current pathological fracture    Hypokalemia    Osteoporosis       Past Surgical History:   No past surgical history on file.    Family History:   Reviewed: No family history of coronary artery disease or atrial fibrillation.    Social History:    reports that she has never smoked. She has never used smokeless tobacco. She reports that she does not use drugs.    Review of Systems:   12 systems are reviewed negative except for in HPI.    Meds:     Current Outpatient  Medications:     acetaminophen (TYLENOL) 500 MG tablet, Take 500 mg by mouth every 6 hours as needed for mild pain, Disp: , Rfl:     amLODIPine (NORVASC) 2.5 MG tablet, [AMLODIPINE (NORVASC) 2.5 MG TABLET] Take 1 tablet daily, Disp: , Rfl:     camphor-menthol (DERMASARRA) 0.5-0.5 % external lotion, Apply topically 4 times daily as needed for skin care, Disp: , Rfl:     lidocaine 4 % external cream, Apply topically as needed, Disp: , Rfl:     lisinopril-hydrochlorothiazide (PRINZIDE,ZESTORETIC) 20-25 mg per tablet, Take 1 tablet by mouth daily, Disp: , Rfl:     polyethylene glycol (MIRALAX) 17 g packet, Take 1 packet by mouth daily, Disp: , Rfl:     potassium chloride 20 mEq TbER, [POTASSIUM CHLORIDE 20 MEQ TBER] Take 1 tablet by mouth daily., Disp: , Rfl:     Allergies:   Patient has no known allergies.      Objective:      Physical Exam  57.2 kg (126 lb)     Body mass index is 21.63 kg/m .  /60 (BP Location: Right arm, Patient Position: Sitting, Cuff Size: Adult Regular)   Pulse 70   Resp 14   Wt 57.2 kg (126 lb)   BMI 21.63 kg/m      General Appearance:   Awake, Alert, No acute distress.   HEENT:  Pupil equal and reactive to light. No scleral icterus; the mucous membranes were moist.   Neck: No cervical bruits. No JVD. No thyromegaly.     Chest: The spine was straight. The chest was symmetric.   Lungs:   Respirations unlabored; Lungs are clear to auscultation. No crackles. No wheezing.   Cardiovascular:   Regular rhythm and rate, normal first and second heart sounds with no murmurs. No rubs or gallops.    Abdomen:  Soft. No tenderness. Non-distended. Bowels sounds are present   Extremities: Equal tibial pulses. No leg edema.   Skin: No rashes or ulcers. Warm, Dry.   Musculoskeletal: No tenderness. No deformity.   Neurologic: Mood and affect are appropriate. No focal deficits.         Pacemaker interrogation today: Personally reviewed  Normal device function  1 episode of atrial fibrillation lasted 7  "hours on September 18, 2023.  Since then no recurrent episode.        Lab Review   Lab Results   Component Value Date     01/09/2024    CO2 24 01/09/2024    CO2 26 02/07/2022    BUN 24.5 01/09/2024    BUN 25 02/07/2022     Lab Results   Component Value Date    WBC 7.1 01/09/2024    HGB 13.8 01/09/2024    HCT 42.5 01/09/2024    MCV 93 01/09/2024     01/09/2024     Lab Results   Component Value Date    CHOL 180 02/08/2021    CHOL 175 01/27/2020    CHOL 194 01/15/2019     Lab Results   Component Value Date    HDL 67 02/08/2021    HDL 64 01/27/2020    HDL 68 01/15/2019     No components found for: \"LDLCALC\"  Lab Results   Component Value Date    TRIG 138 02/08/2021    TRIG 147 01/27/2020    TRIG 69 01/15/2019     Lab Results   Component Value Date    TSH 1.29 03/14/2023                 Thank you for allowing me to participate in the care of your patient.      Sincerely,     Mabel Hollingsworth MD     [facility]  cc:   Carlos Bonilla MD  [address]  [place] [address]      "

## 2024-06-04 ENCOUNTER — LAB REQUISITION (OUTPATIENT)
Dept: LAB | Facility: CLINIC | Age: 89
End: 2024-06-04
Payer: COMMERCIAL

## 2024-06-04 LAB
ALBUMIN UR-MCNC: 20 MG/DL
APPEARANCE UR: CLEAR
BILIRUB UR QL STRIP: NEGATIVE
COLOR UR AUTO: YELLOW
GLUCOSE UR STRIP-MCNC: NEGATIVE MG/DL
HGB UR QL STRIP: NEGATIVE
KETONES UR STRIP-MCNC: ABNORMAL MG/DL
LEUKOCYTE ESTERASE UR QL STRIP: ABNORMAL
MUCOUS THREADS #/AREA URNS LPF: PRESENT /LPF
NITRATE UR QL: NEGATIVE
PH UR STRIP: 6 [PH] (ref 5–7)
RBC URINE: 2 /HPF
SP GR UR STRIP: 1.02 (ref 1–1.03)
UROBILINOGEN UR STRIP-MCNC: 2 MG/DL
WBC URINE: 10 /HPF

## 2024-06-04 PROCEDURE — 81001 URINALYSIS AUTO W/SCOPE: CPT | Mod: ORL | Performed by: NURSE PRACTITIONER

## 2024-06-10 ENCOUNTER — LAB REQUISITION (OUTPATIENT)
Dept: LAB | Facility: CLINIC | Age: 89
End: 2024-06-10
Payer: COMMERCIAL

## 2024-06-10 DIAGNOSIS — I10 ESSENTIAL (PRIMARY) HYPERTENSION: ICD-10-CM

## 2024-06-10 DIAGNOSIS — E03.9 HYPOTHYROIDISM, UNSPECIFIED: ICD-10-CM

## 2024-06-10 DIAGNOSIS — D64.9 ANEMIA, UNSPECIFIED: ICD-10-CM

## 2024-06-12 LAB
ANION GAP SERPL CALCULATED.3IONS-SCNC: 12 MMOL/L (ref 7–15)
BUN SERPL-MCNC: 22.9 MG/DL (ref 8–23)
CALCIUM SERPL-MCNC: 9.7 MG/DL (ref 8.2–9.6)
CHLORIDE SERPL-SCNC: 103 MMOL/L (ref 98–107)
CREAT SERPL-MCNC: 0.84 MG/DL (ref 0.51–0.95)
DEPRECATED HCO3 PLAS-SCNC: 25 MMOL/L (ref 22–29)
EGFRCR SERPLBLD CKD-EPI 2021: 65 ML/MIN/1.73M2
ERYTHROCYTE [DISTWIDTH] IN BLOOD BY AUTOMATED COUNT: 14.3 % (ref 10–15)
GLUCOSE SERPL-MCNC: 92 MG/DL (ref 70–99)
HCT VFR BLD AUTO: 43.7 % (ref 35–47)
HGB BLD-MCNC: 14.3 G/DL (ref 11.7–15.7)
MCH RBC QN AUTO: 30.1 PG (ref 26.5–33)
MCHC RBC AUTO-ENTMCNC: 32.7 G/DL (ref 31.5–36.5)
MCV RBC AUTO: 92 FL (ref 78–100)
PLATELET # BLD AUTO: 192 10E3/UL (ref 150–450)
POTASSIUM SERPL-SCNC: 3.7 MMOL/L (ref 3.4–5.3)
RBC # BLD AUTO: 4.75 10E6/UL (ref 3.8–5.2)
SODIUM SERPL-SCNC: 140 MMOL/L (ref 135–145)
TSH SERPL DL<=0.005 MIU/L-ACNC: 1.36 UIU/ML (ref 0.3–4.2)
WBC # BLD AUTO: 6.5 10E3/UL (ref 4–11)

## 2024-06-12 PROCEDURE — 80048 BASIC METABOLIC PNL TOTAL CA: CPT | Mod: ORL | Performed by: NURSE PRACTITIONER

## 2024-06-12 PROCEDURE — 85027 COMPLETE CBC AUTOMATED: CPT | Mod: ORL | Performed by: NURSE PRACTITIONER

## 2024-06-12 PROCEDURE — P9604 ONE-WAY ALLOW PRORATED TRIP: HCPCS | Mod: ORL | Performed by: NURSE PRACTITIONER

## 2024-06-12 PROCEDURE — 36415 COLL VENOUS BLD VENIPUNCTURE: CPT | Mod: ORL | Performed by: NURSE PRACTITIONER

## 2024-06-12 PROCEDURE — 84443 ASSAY THYROID STIM HORMONE: CPT | Mod: ORL | Performed by: NURSE PRACTITIONER

## 2024-06-18 LAB — HOLD SPECIMEN: NORMAL

## 2024-06-18 PROCEDURE — 36415 COLL VENOUS BLD VENIPUNCTURE: CPT | Mod: ORL | Performed by: NURSE PRACTITIONER

## 2024-09-18 ENCOUNTER — TELEPHONE (OUTPATIENT)
Dept: CARDIOLOGY | Facility: CLINIC | Age: 89
End: 2024-09-18
Payer: COMMERCIAL

## 2024-09-18 NOTE — TELEPHONE ENCOUNTER
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 She moved to the [facility] for about a year and just moved back to [facility]isted [facility] last week and resumed care under Nikki. Nikki was surprised that she didn't arrive with a remote monitor and is wondering if we are still checking her device, and if they can get a new monitor.  She is also wondering when Siobhan needs her next in clinic check and how to go about getting this scheduled.    Upon review, patient follows at [facility]. She has an [employer] which can no longer be checked remotely so patient will need to be seen in clinic for all device checks.    Called Siobhan back and informed her of above. Directed her to the [facility] Device RN line ([phone])    JOEY RN

## 2024-10-29 NOTE — PROGRESS NOTES
In clinic device check with Device RN and follow-up with Dr. Hollingsworth.  Please see link for full device report.  Patient was informed of results and next follow up during today's visit.     DC instructions

## 2024-11-29 ENCOUNTER — LAB REQUISITION (OUTPATIENT)
Dept: LAB | Facility: CLINIC | Age: 89
End: 2024-11-29
Payer: COMMERCIAL

## 2024-11-29 DIAGNOSIS — Z79.899 OTHER LONG TERM (CURRENT) DRUG THERAPY: ICD-10-CM

## 2024-12-03 LAB
ANION GAP SERPL CALCULATED.3IONS-SCNC: 11 MMOL/L (ref 7–15)
BUN SERPL-MCNC: 39.4 MG/DL (ref 8–23)
CALCIUM SERPL-MCNC: 9.2 MG/DL (ref 8.8–10.4)
CHLORIDE SERPL-SCNC: 107 MMOL/L (ref 98–107)
CREAT SERPL-MCNC: 0.94 MG/DL (ref 0.51–0.95)
EGFRCR SERPLBLD CKD-EPI 2021: 56 ML/MIN/1.73M2
GLUCOSE SERPL-MCNC: 92 MG/DL (ref 70–99)
HCO3 SERPL-SCNC: 27 MMOL/L (ref 22–29)
POTASSIUM SERPL-SCNC: 3.6 MMOL/L (ref 3.4–5.3)
SODIUM SERPL-SCNC: 145 MMOL/L (ref 135–145)

## 2025-01-31 ENCOUNTER — ANCILLARY PROCEDURE (OUTPATIENT)
Dept: CARDIOLOGY | Facility: CLINIC | Age: OVER 89
End: 2025-01-31
Attending: INTERNAL MEDICINE
Payer: COMMERCIAL

## 2025-01-31 DIAGNOSIS — Z95.0 PACEMAKER: ICD-10-CM

## 2025-01-31 DIAGNOSIS — Z95.0 CARDIAC PACEMAKER IN SITU: ICD-10-CM

## 2025-01-31 LAB
MDC_IDC_LEAD_CONNECTION_STATUS: NORMAL
MDC_IDC_LEAD_CONNECTION_STATUS: NORMAL
MDC_IDC_LEAD_IMPLANT_DT: NORMAL
MDC_IDC_LEAD_IMPLANT_DT: NORMAL
MDC_IDC_LEAD_LOCATION: NORMAL
MDC_IDC_LEAD_LOCATION: NORMAL
MDC_IDC_LEAD_LOCATION_DETAIL_1: NORMAL
MDC_IDC_LEAD_LOCATION_DETAIL_1: NORMAL
MDC_IDC_LEAD_MFG: NORMAL
MDC_IDC_LEAD_MFG: NORMAL
MDC_IDC_LEAD_MODEL: NORMAL
MDC_IDC_LEAD_MODEL: NORMAL
MDC_IDC_LEAD_POLARITY_TYPE: NORMAL
MDC_IDC_LEAD_POLARITY_TYPE: NORMAL
MDC_IDC_LEAD_SERIAL: NORMAL
MDC_IDC_LEAD_SERIAL: NORMAL
MDC_IDC_MSMT_BATTERY_DTM: NORMAL
MDC_IDC_MSMT_BATTERY_REMAINING_LONGEVITY: 30 MO
MDC_IDC_MSMT_BATTERY_STATUS: NORMAL
MDC_IDC_MSMT_LEADCHNL_RA_IMPEDANCE_VALUE: 590 OHM
MDC_IDC_MSMT_LEADCHNL_RA_PACING_THRESHOLD_AMPLITUDE: 1.2 V
MDC_IDC_MSMT_LEADCHNL_RA_PACING_THRESHOLD_PULSEWIDTH: 0.4 MS
MDC_IDC_MSMT_LEADCHNL_RA_SENSING_INTR_AMPL: 1.5 MV
MDC_IDC_MSMT_LEADCHNL_RV_IMPEDANCE_VALUE: 460 OHM
MDC_IDC_MSMT_LEADCHNL_RV_PACING_THRESHOLD_AMPLITUDE: 1 V
MDC_IDC_MSMT_LEADCHNL_RV_PACING_THRESHOLD_PULSEWIDTH: 0.4 MS
MDC_IDC_MSMT_LEADCHNL_RV_SENSING_INTR_AMPL: 7 MV
MDC_IDC_PG_IMPLANT_DTM: NORMAL
MDC_IDC_PG_MFG: NORMAL
MDC_IDC_PG_MODEL: NORMAL
MDC_IDC_PG_SERIAL: NORMAL
MDC_IDC_PG_TYPE: NORMAL
MDC_IDC_SESS_CLINIC_NAME: NORMAL
MDC_IDC_SESS_DTM: NORMAL
MDC_IDC_SESS_TYPE: NORMAL
MDC_IDC_SET_BRADY_AT_MODE_SWITCH_MODE: NORMAL
MDC_IDC_SET_BRADY_AT_MODE_SWITCH_RATE: 170 {BEATS}/MIN
MDC_IDC_SET_BRADY_HYSTRATE: NORMAL
MDC_IDC_SET_BRADY_LOWRATE: 60 {BEATS}/MIN
MDC_IDC_SET_BRADY_MAX_TRACKING_RATE: 130 {BEATS}/MIN
MDC_IDC_SET_BRADY_MODE: NORMAL
MDC_IDC_SET_BRADY_PAV_DELAY_HIGH: 110 MS
MDC_IDC_SET_BRADY_PAV_DELAY_LOW: 300 MS
MDC_IDC_SET_BRADY_SAV_DELAY_LOW: 280 MS
MDC_IDC_SET_LEADCHNL_RA_PACING_AMPLITUDE: 1.8 V
MDC_IDC_SET_LEADCHNL_RA_PACING_CAPTURE_MODE: NORMAL
MDC_IDC_SET_LEADCHNL_RA_PACING_POLARITY: NORMAL
MDC_IDC_SET_LEADCHNL_RA_PACING_PULSEWIDTH: 0.4 MS
MDC_IDC_SET_LEADCHNL_RA_SENSING_ADAPTATION_MODE: NORMAL
MDC_IDC_SET_LEADCHNL_RA_SENSING_POLARITY: NORMAL
MDC_IDC_SET_LEADCHNL_RA_SENSING_SENSITIVITY: 0.25 MV
MDC_IDC_SET_LEADCHNL_RV_PACING_AMPLITUDE: 2 V
MDC_IDC_SET_LEADCHNL_RV_PACING_CAPTURE_MODE: NORMAL
MDC_IDC_SET_LEADCHNL_RV_PACING_POLARITY: NORMAL
MDC_IDC_SET_LEADCHNL_RV_PACING_PULSEWIDTH: 0.4 MS
MDC_IDC_SET_LEADCHNL_RV_SENSING_ADAPTATION_MODE: NORMAL
MDC_IDC_SET_LEADCHNL_RV_SENSING_POLARITY: NORMAL
MDC_IDC_SET_LEADCHNL_RV_SENSING_SENSITIVITY: 2.5 MV
MDC_IDC_STAT_AT_DTM_END: NORMAL
MDC_IDC_STAT_AT_DTM_START: NORMAL
MDC_IDC_STAT_AT_MODE_SW_COUNT: 1
MDC_IDC_STAT_AT_MODE_SW_MAX_DURATION: NORMAL S
MDC_IDC_STAT_BRADY_DTM_END: NORMAL
MDC_IDC_STAT_BRADY_DTM_START: NORMAL
MDC_IDC_STAT_BRADY_RA_PERCENT_PACED: 8 %
MDC_IDC_STAT_BRADY_RV_PERCENT_PACED: 2 %
MDC_IDC_STAT_EPISODE_RECENT_COUNT: 0
MDC_IDC_STAT_EPISODE_RECENT_COUNT: 1
MDC_IDC_STAT_EPISODE_RECENT_COUNT_DTM_END: NORMAL
MDC_IDC_STAT_EPISODE_RECENT_COUNT_DTM_END: NORMAL
MDC_IDC_STAT_EPISODE_RECENT_COUNT_DTM_START: NORMAL
MDC_IDC_STAT_EPISODE_RECENT_COUNT_DTM_START: NORMAL
MDC_IDC_STAT_EPISODE_TYPE: NORMAL
MDC_IDC_STAT_EPISODE_TYPE: NORMAL
MDC_IDC_STAT_EPISODE_VENDOR_TYPE: NORMAL

## 2025-01-31 PROCEDURE — 93280 PM DEVICE PROGR EVAL DUAL: CPT | Performed by: INTERNAL MEDICINE

## 2025-02-10 ENCOUNTER — LAB REQUISITION (OUTPATIENT)
Dept: LAB | Facility: CLINIC | Age: OVER 89
End: 2025-02-10
Payer: COMMERCIAL

## 2025-02-10 LAB
ALBUMIN UR-MCNC: 70 MG/DL
APPEARANCE UR: ABNORMAL
BILIRUB UR QL STRIP: NEGATIVE
COLOR UR AUTO: ABNORMAL
GLUCOSE UR STRIP-MCNC: NEGATIVE MG/DL
HGB UR QL STRIP: ABNORMAL
HYALINE CASTS: 29 /LPF
KETONES UR STRIP-MCNC: NEGATIVE MG/DL
LEUKOCYTE ESTERASE UR QL STRIP: ABNORMAL
MUCOUS THREADS #/AREA URNS LPF: PRESENT /LPF
NITRATE UR QL: NEGATIVE
PH UR STRIP: 6 [PH] (ref 5–7)
RBC URINE: 71 /HPF
SP GR UR STRIP: 1.01 (ref 1–1.03)
UROBILINOGEN UR STRIP-MCNC: NORMAL MG/DL
WBC CLUMPS #/AREA URNS HPF: PRESENT /HPF
WBC URINE: >182 /HPF

## 2025-02-11 LAB — BACTERIA UR CULT: ABNORMAL

## 2025-02-20 PROCEDURE — 81001 URINALYSIS AUTO W/SCOPE: CPT | Mod: ORL | Performed by: NURSE PRACTITIONER

## 2025-02-21 ENCOUNTER — LAB REQUISITION (OUTPATIENT)
Dept: LAB | Facility: CLINIC | Age: OVER 89
End: 2025-02-21
Payer: COMMERCIAL

## 2025-02-21 DIAGNOSIS — R30.0 DYSURIA: ICD-10-CM

## 2025-02-21 LAB
ALBUMIN UR-MCNC: 10 MG/DL
APPEARANCE UR: CLEAR
BACTERIA #/AREA URNS HPF: ABNORMAL /HPF
BILIRUB UR QL STRIP: NEGATIVE
COLOR UR AUTO: YELLOW
GLUCOSE UR STRIP-MCNC: NEGATIVE MG/DL
HGB UR QL STRIP: NEGATIVE
KETONES UR STRIP-MCNC: NEGATIVE MG/DL
LEUKOCYTE ESTERASE UR QL STRIP: NEGATIVE
MUCOUS THREADS #/AREA URNS LPF: PRESENT /LPF
NITRATE UR QL: NEGATIVE
PH UR STRIP: 5.5 [PH] (ref 5–7)
RBC URINE: 0 /HPF
SP GR UR STRIP: 1.03 (ref 1–1.03)
UROBILINOGEN UR STRIP-MCNC: NORMAL MG/DL
WBC URINE: 0 /HPF